# Patient Record
Sex: FEMALE | Race: WHITE | Employment: FULL TIME | URBAN - NONMETROPOLITAN AREA
[De-identification: names, ages, dates, MRNs, and addresses within clinical notes are randomized per-mention and may not be internally consistent; named-entity substitution may affect disease eponyms.]

---

## 2017-08-24 ENCOUNTER — OFFICE VISIT (OUTPATIENT)
Dept: FAMILY MEDICINE CLINIC | Age: 27
End: 2017-08-24

## 2017-08-24 VITALS
WEIGHT: 244 LBS | HEART RATE: 65 BPM | SYSTOLIC BLOOD PRESSURE: 122 MMHG | BODY MASS INDEX: 41.66 KG/M2 | DIASTOLIC BLOOD PRESSURE: 84 MMHG | HEIGHT: 64 IN | OXYGEN SATURATION: 98 % | RESPIRATION RATE: 16 BRPM

## 2017-08-24 DIAGNOSIS — Z30.09 BIRTH CONTROL COUNSELING: ICD-10-CM

## 2017-08-24 DIAGNOSIS — Z71.3 DIETARY COUNSELING: ICD-10-CM

## 2017-08-24 DIAGNOSIS — E66.01 MORBID OBESITY DUE TO EXCESS CALORIES (HCC): Primary | ICD-10-CM

## 2017-08-24 PROCEDURE — 99214 OFFICE O/P EST MOD 30 MIN: CPT | Performed by: NURSE PRACTITIONER

## 2017-08-24 ASSESSMENT — ENCOUNTER SYMPTOMS
GASTROINTESTINAL NEGATIVE: 1
RESPIRATORY NEGATIVE: 1

## 2017-11-25 ENCOUNTER — HOSPITAL ENCOUNTER (OUTPATIENT)
Dept: OTHER | Age: 27
Discharge: HOME | End: 2017-11-25
Attending: NURSE PRACTITIONER | Admitting: NURSE PRACTITIONER

## 2017-11-25 LAB
RAPID INFLUENZA  B AGN: NEGATIVE
RAPID INFLUENZA A AGN: NEGATIVE

## 2018-01-12 ENCOUNTER — OFFICE VISIT (OUTPATIENT)
Dept: FAMILY MEDICINE CLINIC | Age: 28
End: 2018-01-12

## 2018-01-12 VITALS
WEIGHT: 238 LBS | RESPIRATION RATE: 15 BRPM | DIASTOLIC BLOOD PRESSURE: 78 MMHG | BODY MASS INDEX: 40.85 KG/M2 | SYSTOLIC BLOOD PRESSURE: 122 MMHG | HEART RATE: 52 BPM

## 2018-01-12 DIAGNOSIS — R30.0 DYSURIA: Primary | ICD-10-CM

## 2018-01-12 LAB
BILIRUBIN, POC: NEGATIVE
BLOOD URINE, POC: NEGATIVE
CLARITY, POC: ABNORMAL
COLOR, POC: ABNORMAL
GLUCOSE URINE, POC: NEGATIVE
KETONES, POC: NEGATIVE
LEUKOCYTE EST, POC: NEGATIVE
NITRITE, POC: NEGATIVE
PH, POC: 5.5
PROTEIN, POC: ABNORMAL
SPECIFIC GRAVITY, POC: >=1.03
UROBILINOGEN, POC: 0.2

## 2018-01-12 PROCEDURE — 81002 URINALYSIS NONAUTO W/O SCOPE: CPT | Performed by: NURSE PRACTITIONER

## 2018-01-12 PROCEDURE — 99213 OFFICE O/P EST LOW 20 MIN: CPT | Performed by: NURSE PRACTITIONER

## 2018-01-12 RX ORDER — PHENAZOPYRIDINE HYDROCHLORIDE 200 MG/1
200 TABLET, FILM COATED ORAL 3 TIMES DAILY PRN
Qty: 9 TABLET | Refills: 0 | Status: SHIPPED | OUTPATIENT
Start: 2018-01-12 | End: 2018-01-15

## 2018-01-12 ASSESSMENT — ENCOUNTER SYMPTOMS
SHORTNESS OF BREATH: 0
WHEEZING: 0
VOMITING: 0
BACK PAIN: 0
COUGH: 0
RESPIRATORY NEGATIVE: 1
NAUSEA: 0

## 2018-01-12 ASSESSMENT — PATIENT HEALTH QUESTIONNAIRE - PHQ9
SUM OF ALL RESPONSES TO PHQ9 QUESTIONS 1 & 2: 0
1. LITTLE INTEREST OR PLEASURE IN DOING THINGS: 0
2. FEELING DOWN, DEPRESSED OR HOPELESS: 0
SUM OF ALL RESPONSES TO PHQ QUESTIONS 1-9: 0

## 2018-01-12 NOTE — PROGRESS NOTES
only drinks approximately 20 ounces of water a day and no other water. She does drink 3-4 mugs of coffee. I gave her a prescription for Pyridium to help with that discomfort  - POCT Urinalysis no Micro  - phenazopyridine (PYRIDIUM) 200 MG tablet; Take 1 tablet by mouth 3 times daily as needed for Pain  Dispense: 9 tablet; Refill: 0      Return if symptoms worsen or fail to improve.       Controlled Substances Monitoring:              (Please note that portions of this note may have been completed with a voice recognition program. Efforts were made to edit the dictations but occasionally words are mis-transcribed.)

## 2018-01-12 NOTE — PATIENT INSTRUCTIONS
Patient Education        Painful Urination (Dysuria): Care Instructions  Your Care Instructions  Burning pain with urination (dysuria) is a common symptom of a urinary tract infection or other urinary problems. The bladder may become inflamed. This can cause pain when the bladder fills and empties. You may also feel pain if the tube that carries urine from the bladder to the outside of the body (urethra) gets irritated or infected. Sexually transmitted infections (STIs) also may cause pain when you urinate. Sometimes the pain can be caused by things other than an infection. The urethra can be irritated by soaps, perfumes, or foreign objects in the urethra. Kidney stones can cause pain when they pass through the urethra. The cause may be hard to find. You may need tests. Treatment for painful urination depends on the cause. Follow-up care is a key part of your treatment and safety. Be sure to make and go to all appointments, and call your doctor if you are having problems. It's also a good idea to know your test results and keep a list of the medicines you take. How can you care for yourself at home? · Drink extra water for the next day or two. This will help make the urine less concentrated. (If you have kidney, heart, or liver disease and have to limit fluids, talk with your doctor before you increase the amount of fluids you drink.)  · Avoid drinks that are carbonated or have caffeine. They can irritate the bladder. · Urinate often. Try to empty your bladder each time. For women:  · Urinate right after you have sex. · After going to the bathroom, wipe from front to back. · Avoid douches, bubble baths, and feminine hygiene sprays. And avoid other feminine hygiene products that have deodorants. When should you call for help? Call your doctor now or seek immediate medical care if:  ? · You have new symptoms, such as fever, nausea, or vomiting. ? · You have new or worse symptoms of a urinary problem. For example:  ¨ You have blood or pus in your urine. ¨ You have chills or body aches. ¨ It hurts worse to urinate. ¨ You have groin or belly pain. ¨ You have pain in your back just below your rib cage (the flank area). ? Watch closely for changes in your health, and be sure to contact your doctor if you have any problems. Where can you learn more? Go to https://[employer]. org and sign in to your [employer] [facility] account. Enter C568 in the [employer] box to learn more about \"Painful Urination (Dysuria): Care Instructions. \"     If you do not have an account, please click on the \"Sign Up Now\" link. Current as of: May 12, 2017  Content Version: 11.5  © 7273-8051 Healthwise, Incorporated. Care instructions adapted under license by [facility] ([facility]). If you have questions about a medical condition or this instruction, always ask your healthcare professional. [place] [address] any warranty or liability for your use of this information.

## 2018-04-10 ENCOUNTER — OFFICE VISIT (OUTPATIENT)
Dept: FAMILY MEDICINE CLINIC | Age: 28
End: 2018-04-10

## 2018-04-10 VITALS
HEIGHT: 64 IN | OXYGEN SATURATION: 99 % | DIASTOLIC BLOOD PRESSURE: 80 MMHG | HEART RATE: 67 BPM | WEIGHT: 225 LBS | SYSTOLIC BLOOD PRESSURE: 124 MMHG | RESPIRATION RATE: 16 BRPM | BODY MASS INDEX: 38.41 KG/M2 | TEMPERATURE: 98.1 F

## 2018-04-10 DIAGNOSIS — J40 BRONCHITIS: Primary | ICD-10-CM

## 2018-04-10 PROCEDURE — 99214 OFFICE O/P EST MOD 30 MIN: CPT | Performed by: NURSE PRACTITIONER

## 2018-04-10 RX ORDER — CLARITHROMYCIN 500 MG/1
500 TABLET, COATED ORAL 2 TIMES DAILY
Qty: 20 TABLET | Refills: 0 | Status: SHIPPED | OUTPATIENT
Start: 2018-04-10 | End: 2018-04-20

## 2018-04-10 RX ORDER — BENZONATATE 100 MG/1
100 CAPSULE ORAL 3 TIMES DAILY PRN
Qty: 30 CAPSULE | Refills: 0 | Status: SHIPPED | OUTPATIENT
Start: 2018-04-10 | End: 2018-04-17

## 2018-04-10 RX ORDER — DEXTROMETHORPHAN HYDROBROMIDE AND PROMETHAZINE HYDROCHLORIDE 15; 6.25 MG/5ML; MG/5ML
5 SYRUP ORAL NIGHTLY PRN
Qty: 140 ML | Refills: 0 | Status: SHIPPED | OUTPATIENT
Start: 2018-04-10 | End: 2018-04-17

## 2018-04-10 ASSESSMENT — ENCOUNTER SYMPTOMS
SINUS PAIN: 0
EYES NEGATIVE: 1
VOMITING: 0
SINUS PRESSURE: 0
WHEEZING: 0
SHORTNESS OF BREATH: 0
DIARRHEA: 0
NAUSEA: 0
COUGH: 1
SORE THROAT: 0

## 2018-05-09 ENCOUNTER — TELEPHONE (OUTPATIENT)
Dept: FAMILY MEDICINE CLINIC | Age: 28
End: 2018-05-09

## 2018-05-10 ENCOUNTER — OFFICE VISIT (OUTPATIENT)
Dept: FAMILY MEDICINE CLINIC | Age: 28
End: 2018-05-10

## 2018-05-10 VITALS
OXYGEN SATURATION: 97 % | DIASTOLIC BLOOD PRESSURE: 60 MMHG | HEIGHT: 64 IN | WEIGHT: 231 LBS | TEMPERATURE: 99.3 F | SYSTOLIC BLOOD PRESSURE: 104 MMHG | BODY MASS INDEX: 39.44 KG/M2 | HEART RATE: 56 BPM | RESPIRATION RATE: 14 BRPM

## 2018-05-10 DIAGNOSIS — R94.31 ABNORMAL EKG: ICD-10-CM

## 2018-05-10 DIAGNOSIS — R55 SYNCOPE, UNSPECIFIED SYNCOPE TYPE: Primary | ICD-10-CM

## 2018-05-10 DIAGNOSIS — R00.1 BRADYCARDIA: ICD-10-CM

## 2018-05-10 DIAGNOSIS — I49.1 PAC (PREMATURE ATRIAL CONTRACTION): ICD-10-CM

## 2018-05-10 PROCEDURE — 93000 ELECTROCARDIOGRAM COMPLETE: CPT | Performed by: PHYSICIAN ASSISTANT

## 2018-05-10 PROCEDURE — 99214 OFFICE O/P EST MOD 30 MIN: CPT | Performed by: PHYSICIAN ASSISTANT

## 2018-05-10 ASSESSMENT — ENCOUNTER SYMPTOMS
DIARRHEA: 0
VOMITING: 0
TROUBLE SWALLOWING: 0
COUGH: 0
SHORTNESS OF BREATH: 0
ABDOMINAL PAIN: 0
NAUSEA: 0

## 2018-05-15 ENCOUNTER — INITIAL CONSULT (OUTPATIENT)
Dept: CARDIOLOGY CLINIC | Age: 28
End: 2018-05-15

## 2018-05-15 VITALS
BODY MASS INDEX: 39.88 KG/M2 | HEART RATE: 84 BPM | HEIGHT: 64 IN | SYSTOLIC BLOOD PRESSURE: 96 MMHG | DIASTOLIC BLOOD PRESSURE: 60 MMHG | WEIGHT: 233.6 LBS

## 2018-05-15 DIAGNOSIS — R55 VASOVAGAL SYNCOPE: Primary | ICD-10-CM

## 2018-05-15 PROCEDURE — 99243 OFF/OP CNSLTJ NEW/EST LOW 30: CPT | Performed by: INTERNAL MEDICINE

## 2018-05-15 RX ORDER — MIDODRINE HYDROCHLORIDE 2.5 MG/1
2.5 TABLET ORAL 3 TIMES DAILY
Qty: 90 TABLET | Refills: 3 | Status: SHIPPED | OUTPATIENT
Start: 2018-05-15 | End: 2018-05-24 | Stop reason: ALTCHOICE

## 2018-05-16 ENCOUNTER — TELEPHONE (OUTPATIENT)
Dept: CARDIOLOGY CLINIC | Age: 28
End: 2018-05-16

## 2018-05-16 NOTE — TELEPHONE ENCOUNTER
Patient was just put on Midodrine 5/15 she stated a hour after she took she had diarrhea and increased urination issues

## 2018-05-17 ENCOUNTER — TELEPHONE (OUTPATIENT)
Dept: CARDIOLOGY CLINIC | Age: 28
End: 2018-05-17

## 2018-05-17 ENCOUNTER — PROCEDURE VISIT (OUTPATIENT)
Dept: CARDIOLOGY CLINIC | Age: 28
End: 2018-05-17

## 2018-05-17 VITALS
BODY MASS INDEX: 39.78 KG/M2 | WEIGHT: 233 LBS | DIASTOLIC BLOOD PRESSURE: 60 MMHG | HEART RATE: 74 BPM | SYSTOLIC BLOOD PRESSURE: 122 MMHG | HEIGHT: 64 IN

## 2018-05-17 DIAGNOSIS — R55 VASOVAGAL SYNCOPE: ICD-10-CM

## 2018-05-17 DIAGNOSIS — R94.31 ABNORMAL EKG: ICD-10-CM

## 2018-05-17 PROCEDURE — 93015 CV STRESS TEST SUPVJ I&R: CPT | Performed by: INTERNAL MEDICINE

## 2018-05-18 RX ORDER — FLUDROCORTISONE ACETATE 0.1 MG/1
0.1 TABLET ORAL DAILY
COMMUNITY
End: 2018-05-24 | Stop reason: SDUPTHER

## 2018-05-24 ENCOUNTER — TELEPHONE (OUTPATIENT)
Dept: CARDIOLOGY CLINIC | Age: 28
End: 2018-05-24

## 2018-05-24 ENCOUNTER — PROCEDURE VISIT (OUTPATIENT)
Dept: CARDIOLOGY CLINIC | Age: 28
End: 2018-05-24

## 2018-05-24 DIAGNOSIS — R55 SYNCOPE, VASOVAGAL: Primary | ICD-10-CM

## 2018-05-24 DIAGNOSIS — R55 VASOVAGAL SYNCOPE: Primary | ICD-10-CM

## 2018-05-24 LAB
LV EF: 58 %
LVEF MODALITY: NORMAL

## 2018-05-24 PROCEDURE — 93880 EXTRACRANIAL BILAT STUDY: CPT | Performed by: INTERNAL MEDICINE

## 2018-05-24 PROCEDURE — 93306 TTE W/DOPPLER COMPLETE: CPT | Performed by: INTERNAL MEDICINE

## 2018-05-24 RX ORDER — FLUDROCORTISONE ACETATE 0.1 MG/1
0.1 TABLET ORAL DAILY
Qty: 30 TABLET | Refills: 2 | Status: SHIPPED | OUTPATIENT
Start: 2018-05-24 | End: 2018-06-06 | Stop reason: SDUPTHER

## 2018-05-25 ENCOUNTER — TELEPHONE (OUTPATIENT)
Dept: CARDIOLOGY CLINIC | Age: 28
End: 2018-05-25

## 2018-05-30 ENCOUNTER — TELEPHONE (OUTPATIENT)
Dept: CARDIOLOGY CLINIC | Age: 28
End: 2018-05-30

## 2018-05-30 NOTE — TELEPHONE ENCOUNTER
Skippy Mcburney called back to the office after seeing the phone number. Skippy Mcburney will be into sign release of information paper.

## 2018-05-31 ENCOUNTER — TELEPHONE (OUTPATIENT)
Dept: FAMILY MEDICINE CLINIC | Age: 28
End: 2018-05-31

## 2018-06-01 ENCOUNTER — TELEPHONE (OUTPATIENT)
Dept: CARDIOLOGY CLINIC | Age: 28
End: 2018-06-01

## 2018-06-06 ENCOUNTER — OFFICE VISIT (OUTPATIENT)
Dept: FAMILY MEDICINE CLINIC | Age: 28
End: 2018-06-06

## 2018-06-06 ENCOUNTER — OFFICE VISIT (OUTPATIENT)
Dept: CARDIOLOGY CLINIC | Age: 28
End: 2018-06-06

## 2018-06-06 VITALS
WEIGHT: 237.8 LBS | SYSTOLIC BLOOD PRESSURE: 116 MMHG | HEIGHT: 64 IN | DIASTOLIC BLOOD PRESSURE: 72 MMHG | HEART RATE: 72 BPM | BODY MASS INDEX: 40.6 KG/M2

## 2018-06-06 VITALS
HEART RATE: 72 BPM | WEIGHT: 237.4 LBS | SYSTOLIC BLOOD PRESSURE: 104 MMHG | DIASTOLIC BLOOD PRESSURE: 62 MMHG | RESPIRATION RATE: 16 BRPM | OXYGEN SATURATION: 98 % | BODY MASS INDEX: 40.75 KG/M2

## 2018-06-06 DIAGNOSIS — Z02.89 ENCOUNTER FOR COMPLETION OF FORM WITH PATIENT: Primary | ICD-10-CM

## 2018-06-06 DIAGNOSIS — I95.1 ORTHOSTATIC HYPOTENSION: Primary | ICD-10-CM

## 2018-06-06 DIAGNOSIS — R94.31 ABNORMAL EKG: ICD-10-CM

## 2018-06-06 DIAGNOSIS — R55 SYNCOPE, UNSPECIFIED SYNCOPE TYPE: ICD-10-CM

## 2018-06-06 DIAGNOSIS — E66.09 CLASS 1 OBESITY DUE TO EXCESS CALORIES WITH SERIOUS COMORBIDITY IN ADULT, UNSPECIFIED BMI: ICD-10-CM

## 2018-06-06 PROCEDURE — 99214 OFFICE O/P EST MOD 30 MIN: CPT | Performed by: INTERNAL MEDICINE

## 2018-06-06 PROCEDURE — 99213 OFFICE O/P EST LOW 20 MIN: CPT | Performed by: NURSE PRACTITIONER

## 2018-06-06 RX ORDER — PHENTERMINE HYDROCHLORIDE 37.5 MG/1
37.5 TABLET ORAL
Qty: 30 TABLET | Refills: 0 | Status: SHIPPED | OUTPATIENT
Start: 2018-06-06 | End: 2018-07-06 | Stop reason: SDUPTHER

## 2018-06-06 RX ORDER — FLUDROCORTISONE ACETATE 0.1 MG/1
0.1 TABLET ORAL DAILY
Qty: 90 TABLET | Refills: 3 | Status: SHIPPED | OUTPATIENT
Start: 2018-06-06 | End: 2020-06-08

## 2018-06-06 ASSESSMENT — ENCOUNTER SYMPTOMS
EYES NEGATIVE: 1
CHEST TIGHTNESS: 0
ABDOMINAL PAIN: 0
RESPIRATORY NEGATIVE: 1
NAUSEA: 0
COUGH: 0
EYE PAIN: 0
SHORTNESS OF BREATH: 0
GASTROINTESTINAL NEGATIVE: 1

## 2018-06-12 ENCOUNTER — TELEPHONE (OUTPATIENT)
Dept: CARDIOLOGY CLINIC | Age: 28
End: 2018-06-12

## 2018-06-13 ENCOUNTER — TELEPHONE (OUTPATIENT)
Dept: CARDIOLOGY CLINIC | Age: 28
End: 2018-06-13

## 2018-07-06 ENCOUNTER — OFFICE VISIT (OUTPATIENT)
Dept: CARDIOLOGY CLINIC | Age: 28
End: 2018-07-06

## 2018-07-06 VITALS
SYSTOLIC BLOOD PRESSURE: 126 MMHG | HEART RATE: 76 BPM | WEIGHT: 237.4 LBS | DIASTOLIC BLOOD PRESSURE: 70 MMHG | HEIGHT: 64 IN | BODY MASS INDEX: 40.53 KG/M2

## 2018-07-06 DIAGNOSIS — E66.09 CLASS 1 OBESITY DUE TO EXCESS CALORIES WITH SERIOUS COMORBIDITY IN ADULT, UNSPECIFIED BMI: ICD-10-CM

## 2018-07-06 PROCEDURE — 99213 OFFICE O/P EST LOW 20 MIN: CPT | Performed by: INTERNAL MEDICINE

## 2018-07-06 RX ORDER — PHENTERMINE HYDROCHLORIDE 37.5 MG/1
37.5 TABLET ORAL
Qty: 30 TABLET | Refills: 0 | Status: SHIPPED | OUTPATIENT
Start: 2018-07-06 | End: 2018-08-05

## 2018-07-06 NOTE — PROGRESS NOTES
delay  Respiratory:  Normal breath sounds, No respiratory distress, No wheezing, No chest tenderness. ,no use of accessory muscles, diaphragm movement is normal  Cardiovascular: (PMI) apex non displaced,no lifts no thrills, no s3,no s4, Normal heart rate, Normal rhythm, No murmurs, No rubs, No gallops. Carotid arteries pulse and amplitude are normal no bruit, no abdominal bruit noted ( normal abdominal aorta ausculation), femoral arteries pulse and amplitude are normal no bruit, pedal pulses are normal  Femoral pulses have normal amplitude, no bruits   Extremities - No cyanosis, clubbing, or significant edema, no varicose veins    Abdomen  No masses, tenderness, or organomegaly, no hepato-splenomegally, no bruits  Musculoskeletal  No significant edema, no kyphosis or scoliosis, no deformity in any extremity noted, muscle strength and tone are normal  Skin: no ulcer,no scar,no stasis dermatitis   Neurologic  alert oriented times 3,Cranial nerves II through XII are grossly intact. There were no gross focal neurologic abnormalities. All sensory and motor nerves examined and were normal  Psychiatric: normal mood and affect    No results found for: CKTOTAL, CKMB, CKMBINDEX, TROPONINI  BNP:  No results found for: BNP  PT/INR:  No results found for: PTINR  No results found for: LABA1C  Lab Results   Component Value Date    CHOL 153 07/28/2016    TRIG 73 07/28/2016    HDL 44 07/28/2016    LDLCALC 94 07/28/2016     Lab Results   Component Value Date    ALT 12 05/06/2017    AST 12 (L) 05/06/2017     TSH:    Lab Results   Component Value Date    TSH 1.95 07/28/2016       Impression:  Karli Smith is a 24 y. o.year old who  has a past medical history of ADHD (attention deficit hyperactivity disorder); Bronchitis; Chronic back pain; H/O Doppler ultrasound (Bilateral Carotids); H/O echocardiogram; H/O exercise stress test; Pertussis; Seasonal allergies; and Syncope. and presents with     Plan:  1.  Vasovagal syncope and hypotension:

## 2018-08-13 ENCOUNTER — TELEPHONE (OUTPATIENT)
Dept: FAMILY MEDICINE CLINIC | Age: 28
End: 2018-08-13

## 2018-11-28 ENCOUNTER — OFFICE VISIT (OUTPATIENT)
Dept: FAMILY MEDICINE CLINIC | Age: 28
End: 2018-11-28
Payer: COMMERCIAL

## 2018-11-28 VITALS
RESPIRATION RATE: 16 BRPM | HEIGHT: 65 IN | BODY MASS INDEX: 41.48 KG/M2 | HEART RATE: 61 BPM | SYSTOLIC BLOOD PRESSURE: 102 MMHG | WEIGHT: 249 LBS | OXYGEN SATURATION: 98 % | DIASTOLIC BLOOD PRESSURE: 64 MMHG

## 2018-11-28 DIAGNOSIS — Z78.9 PARTICIPANT IN HEALTH AND WELLNESS PLAN: Primary | ICD-10-CM

## 2018-11-28 DIAGNOSIS — Z23 NEEDS FLU SHOT: ICD-10-CM

## 2018-11-28 DIAGNOSIS — Z13.220 SCREENING FOR CHOLESTEROL LEVEL: ICD-10-CM

## 2018-11-28 DIAGNOSIS — B35.1 TOENAIL FUNGUS: ICD-10-CM

## 2018-11-28 DIAGNOSIS — Z13.1 SCREENING FOR DIABETES MELLITUS: ICD-10-CM

## 2018-11-28 LAB
CHOLESTEROL, FASTING: 183 MG/DL (ref 0–199)
GLUCOSE BLD-MCNC: 90 MG/DL (ref 70–99)
HDLC SERPL-MCNC: 53 MG/DL (ref 40–60)
LDL CHOLESTEROL CALCULATED: 112 MG/DL
TRIGLYCERIDE, FASTING: 90 MG/DL (ref 0–150)
VLDLC SERPL CALC-MCNC: 18 MG/DL

## 2018-11-28 PROCEDURE — 99395 PREV VISIT EST AGE 18-39: CPT | Performed by: NURSE PRACTITIONER

## 2018-11-28 PROCEDURE — 90471 IMMUNIZATION ADMIN: CPT | Performed by: NURSE PRACTITIONER

## 2018-11-28 PROCEDURE — 36415 COLL VENOUS BLD VENIPUNCTURE: CPT | Performed by: NURSE PRACTITIONER

## 2018-11-28 PROCEDURE — 90686 IIV4 VACC NO PRSV 0.5 ML IM: CPT | Performed by: NURSE PRACTITIONER

## 2018-11-28 ASSESSMENT — ENCOUNTER SYMPTOMS
SHORTNESS OF BREATH: 0
EYE PAIN: 0
EYES NEGATIVE: 1
RHINORRHEA: 0
COLOR CHANGE: 0
EYE REDNESS: 0
NAUSEA: 0
CHEST TIGHTNESS: 0
RESPIRATORY NEGATIVE: 1
COUGH: 0
SINUS PRESSURE: 0
DIARRHEA: 0
SORE THROAT: 0
GASTROINTESTINAL NEGATIVE: 1
BACK PAIN: 0
VOMITING: 0
EYE DISCHARGE: 0

## 2018-11-28 NOTE — PROGRESS NOTES
dysphoric mood and sleep disturbance. The patient is not nervous/anxious. OBJECTIVE:    /64   Pulse 61   Resp 16   Ht 5' 4.5\" (1.638 m)   Wt 249 lb (112.9 kg)   SpO2 98%   BMI 42.08 kg/m²     Physical Exam   Constitutional: She is oriented to person, place, and time. She appears well-developed and well-nourished. No distress. HENT:   Head: Normocephalic and atraumatic. Mouth/Throat: Oropharynx is clear and moist.   Eyes: Pupils are equal, round, and reactive to light. Conjunctivae and EOM are normal. Right eye exhibits no discharge. Left eye exhibits no discharge. Neck: Normal range of motion. Neck supple. Cardiovascular: Normal rate, regular rhythm, normal heart sounds and intact distal pulses. Exam reveals no gallop and no friction rub. No murmur heard. Pulmonary/Chest: Effort normal and breath sounds normal. No respiratory distress. She has no wheezes. Abdominal: Soft. Bowel sounds are normal. She exhibits no distension. There is no tenderness. Musculoskeletal: Normal range of motion. Neurological: She is alert and oriented to person, place, and time. She has normal reflexes. Skin: Skin is warm and dry. Capillary refill takes less than 2 seconds. Psychiatric: She has a normal mood and affect. Her behavior is normal. Judgment and thought content normal.   Vitals reviewed. ASSESSMENT/PLAN:    Problem List     Toenail fungus     Patient reports this is been a problem for years. She was on terbinafine but didn't notice any good effect. She would like to try something different. Current Outpatient Prescriptions   Medication Sig Dispense Refill    fludrocortisone (FLORINEF) 0.1 MG tablet Take 1 tablet by mouth daily 90 tablet 3    cetirizine (ZYRTEC) 5 MG tablet Take 5 mg by mouth daily      ibuprofen (ADVIL;MOTRIN) 600 MG tablet Take 600 mg by mouth every 6 hours as needed for Pain      Multiple Vitamins-Minerals (MULTI FOR HER PO) Take  by mouth.

## 2018-11-28 NOTE — ASSESSMENT & PLAN NOTE
Patient reports this is been a problem for years. She was on terbinafine but didn't notice any good effect. She would like to try something different.

## 2018-11-29 ENCOUNTER — TELEPHONE (OUTPATIENT)
Dept: FAMILY MEDICINE CLINIC | Age: 28
End: 2018-11-29

## 2019-03-06 ENCOUNTER — OFFICE VISIT (OUTPATIENT)
Dept: FAMILY MEDICINE CLINIC | Age: 29
End: 2019-03-06
Payer: COMMERCIAL

## 2019-03-06 VITALS
WEIGHT: 253.6 LBS | SYSTOLIC BLOOD PRESSURE: 102 MMHG | HEART RATE: 89 BPM | DIASTOLIC BLOOD PRESSURE: 60 MMHG | BODY MASS INDEX: 42.86 KG/M2 | RESPIRATION RATE: 16 BRPM | OXYGEN SATURATION: 98 % | TEMPERATURE: 98.4 F

## 2019-03-06 DIAGNOSIS — R19.7 DIARRHEA, UNSPECIFIED TYPE: ICD-10-CM

## 2019-03-06 DIAGNOSIS — R11.0 NAUSEA: Primary | ICD-10-CM

## 2019-03-06 PROCEDURE — 99213 OFFICE O/P EST LOW 20 MIN: CPT | Performed by: NURSE PRACTITIONER

## 2019-03-06 RX ORDER — ONDANSETRON 4 MG/1
4 TABLET, FILM COATED ORAL EVERY 8 HOURS PRN
Qty: 90 TABLET | Refills: 0 | Status: SHIPPED | OUTPATIENT
Start: 2019-03-06 | End: 2019-04-15 | Stop reason: ALTCHOICE

## 2019-03-06 ASSESSMENT — PATIENT HEALTH QUESTIONNAIRE - PHQ9
SUM OF ALL RESPONSES TO PHQ9 QUESTIONS 1 & 2: 0
1. LITTLE INTEREST OR PLEASURE IN DOING THINGS: 0
SUM OF ALL RESPONSES TO PHQ QUESTIONS 1-9: 0
SUM OF ALL RESPONSES TO PHQ QUESTIONS 1-9: 0
2. FEELING DOWN, DEPRESSED OR HOPELESS: 0

## 2019-03-06 ASSESSMENT — ENCOUNTER SYMPTOMS
NAUSEA: 1
SORE THROAT: 0

## 2019-03-07 ASSESSMENT — ENCOUNTER SYMPTOMS
WHEEZING: 0
VOMITING: 1
COUGH: 0
EYES NEGATIVE: 1
DIARRHEA: 0
SHORTNESS OF BREATH: 0
EYE ITCHING: 0
ABDOMINAL PAIN: 0
RESPIRATORY NEGATIVE: 1
EYE DISCHARGE: 0

## 2019-03-08 ENCOUNTER — TELEPHONE (OUTPATIENT)
Dept: FAMILY MEDICINE CLINIC | Age: 29
End: 2019-03-08

## 2019-03-11 ENCOUNTER — OFFICE VISIT (OUTPATIENT)
Dept: FAMILY MEDICINE CLINIC | Age: 29
End: 2019-03-11
Payer: COMMERCIAL

## 2019-03-11 VITALS
RESPIRATION RATE: 16 BRPM | DIASTOLIC BLOOD PRESSURE: 60 MMHG | TEMPERATURE: 98.5 F | BODY MASS INDEX: 42.76 KG/M2 | WEIGHT: 253 LBS | HEART RATE: 84 BPM | OXYGEN SATURATION: 100 % | SYSTOLIC BLOOD PRESSURE: 100 MMHG

## 2019-03-11 DIAGNOSIS — R19.7 DIARRHEA, UNSPECIFIED TYPE: ICD-10-CM

## 2019-03-11 DIAGNOSIS — R11.15 NON-INTRACTABLE CYCLICAL VOMITING WITH NAUSEA: Primary | ICD-10-CM

## 2019-03-11 PROCEDURE — 99213 OFFICE O/P EST LOW 20 MIN: CPT | Performed by: NURSE PRACTITIONER

## 2019-03-11 ASSESSMENT — ENCOUNTER SYMPTOMS
WHEEZING: 0
CHEST TIGHTNESS: 0
VOMITING: 1
DIARRHEA: 1
CONSTIPATION: 0
SHORTNESS OF BREATH: 0
NAUSEA: 1
ABDOMINAL PAIN: 0
COUGH: 0

## 2019-04-15 ENCOUNTER — OFFICE VISIT (OUTPATIENT)
Dept: FAMILY MEDICINE CLINIC | Age: 29
End: 2019-04-15
Payer: COMMERCIAL

## 2019-04-15 ENCOUNTER — TELEPHONE (OUTPATIENT)
Dept: FAMILY MEDICINE CLINIC | Age: 29
End: 2019-04-15

## 2019-04-15 VITALS
HEIGHT: 65 IN | DIASTOLIC BLOOD PRESSURE: 62 MMHG | HEART RATE: 65 BPM | RESPIRATION RATE: 14 BRPM | WEIGHT: 249.4 LBS | BODY MASS INDEX: 41.55 KG/M2 | SYSTOLIC BLOOD PRESSURE: 102 MMHG

## 2019-04-15 DIAGNOSIS — H69.81 DYSFUNCTION OF RIGHT EUSTACHIAN TUBE: ICD-10-CM

## 2019-04-15 DIAGNOSIS — R55 VASOVAGAL SYNCOPE: Primary | ICD-10-CM

## 2019-04-15 DIAGNOSIS — J30.2 SEASONAL ALLERGIES: ICD-10-CM

## 2019-04-15 PROBLEM — H69.91 DYSFUNCTION OF RIGHT EUSTACHIAN TUBE: Status: ACTIVE | Noted: 2019-04-15

## 2019-04-15 PROCEDURE — 99213 OFFICE O/P EST LOW 20 MIN: CPT | Performed by: PHYSICIAN ASSISTANT

## 2019-04-15 RX ORDER — METHYLPREDNISOLONE 4 MG/1
TABLET ORAL
Qty: 1 KIT | Refills: 0 | Status: SHIPPED | OUTPATIENT
Start: 2019-04-15 | End: 2019-04-21

## 2019-04-15 RX ORDER — FLUTICASONE PROPIONATE 50 MCG
2 SPRAY, SUSPENSION (ML) NASAL DAILY
Qty: 1 BOTTLE | Refills: 0 | Status: SHIPPED | OUTPATIENT
Start: 2019-04-15 | End: 2019-05-07 | Stop reason: SDUPTHER

## 2019-04-15 ASSESSMENT — ENCOUNTER SYMPTOMS
SHORTNESS OF BREATH: 0
COUGH: 0
SINUS PRESSURE: 1
TROUBLE SWALLOWING: 0
RHINORRHEA: 1
SORE THROAT: 0

## 2019-04-15 ASSESSMENT — PATIENT HEALTH QUESTIONNAIRE - PHQ9
SUM OF ALL RESPONSES TO PHQ QUESTIONS 1-9: 0
SUM OF ALL RESPONSES TO PHQ QUESTIONS 1-9: 0
2. FEELING DOWN, DEPRESSED OR HOPELESS: 0
1. LITTLE INTEREST OR PLEASURE IN DOING THINGS: 0
SUM OF ALL RESPONSES TO PHQ9 QUESTIONS 1 & 2: 0

## 2019-04-15 NOTE — PROGRESS NOTES
[facility]  1990  34 y.o.  female    SUBJECTIVE:    Chief Complaint   Patient presents with    Blood Pressure Check     at work not able to keep balance; did not feel a decreased in bp as normally does, went to side, did not keep balance; kept herself from falling over; she has the note from her medical dept. BP was 138/ ; dropped to 96/53, 98/54, 98/60; ear problem? ? happens every once in a while, this time at work       HPI  Low BP-chronic, while at work today she started having issue keeping balance. States it did not feel like normal episodes when BP drops. At onset pt was putting bumper on , felt dizzy and took several steps to the side to try get balance. Went to medical and her BP readings npis770/69, 96/53. Dizziness does seem to be worse with rapid head movement. Pt states she has had increasing sinus congestion/ear pressure/nasal congestion over the last week. Current Outpatient Medications on File Prior to Visit   Medication Sig Dispense Refill    fludrocortisone (FLORINEF) 0.1 MG tablet Take 1 tablet by mouth daily 90 tablet 3    cetirizine (ZYRTEC) 5 MG tablet Take 5 mg by mouth daily      ibuprofen (ADVIL;MOTRIN) 600 MG tablet Take 600 mg by mouth every 6 hours as needed for Pain      Multiple Vitamins-Minerals (MULTI FOR HER PO) Take  by mouth. No current facility-administered medications on file prior to visit. Review of PMH, PSH, Family Hx, Allergies and updates made as needed. PHQ-9 Total Score: 0 (4/15/2019 10:45 AM)      Allergies   Allergen Reactions    Amoxicillin Hives and Swelling       Past Medical History:   Diagnosis Date    ADHD (attention deficit hyperactivity disorder)     Bronchitis     Chronic back pain     H/O Doppler ultrasound (Bilateral Carotids) 05/24/2018    Normal vertebral flow. No hemodynamically significant stenosis noted in the internal carotid artery bilaterally.  H/O echocardiogram 05/24/2018    Normal study. EF 55-60%.     H/O exercise stress test 05/17/2018    treadmill    Pertussis     Seasonal allergies     Syncope        Past Surgical History:   Procedure Laterality Date    WISDOM TOOTH EXTRACTION         Social History     Socioeconomic History    Marital status: Single     Spouse name: None    Number of children: None    Years of education: None    Highest education level: None   Occupational History    None   Social Needs    Financial resource strain: None    Food insecurity:     Worry: None     Inability: None    Transportation needs:     Medical: None     Non-medical: None   Tobacco Use    Smoking status: Never Smoker    Smokeless tobacco: Never Used   Substance and Sexual Activity    Alcohol use: Yes     Comment: Rare    Drug use: No    Sexual activity: Not Currently   Lifestyle    Physical activity:     Days per week: None     Minutes per session: None    Stress: None   Relationships    Social connections:     Talks on phone: None     Gets together: None     Attends Sabianism service: None     Active member of club or organization: None     Attends meetings of clubs or organizations: None     Relationship status: None    Intimate partner violence:     Fear of current or ex partner: None     Emotionally abused: None     Physically abused: None     Forced sexual activity: None   Other Topics Concern    None   Social History Narrative    None       Review of Systems   Constitutional: Negative for activity change, appetite change and fever. HENT: Positive for congestion, rhinorrhea and sinus pressure. Negative for sore throat and trouble swallowing. Respiratory: Negative for cough and shortness of breath. Cardiovascular: Negative for chest pain, palpitations and leg swelling. Skin: Negative for rash. Neurological: Positive for light-headedness. Hematological: Negative for adenopathy.        OBJECTIVE:    /62 (Site: Right Upper Arm, Position: Sitting, Cuff Size: Large Adult)   Pulse 65 Resp 14   Ht 5' 5.25\" (1.657 m)   Wt 249 lb 6.4 oz (113.1 kg)   LMP 03/30/2019   Breastfeeding? No   BMI 41.18 kg/m²     Physical Exam   Constitutional: She is oriented to person, place, and time. She appears well-developed and well-nourished. No distress. HENT:   Head: Normocephalic. Right Ear: Tympanic membrane is bulging. Nose: Left sinus exhibits maxillary sinus tenderness. Mouth/Throat: Oropharynx is clear and moist.   Eyes: Conjunctivae are normal. Right eye exhibits nystagmus. Left eye exhibits nystagmus. Neck: Normal range of motion. Neck supple. Cardiovascular: Normal rate, regular rhythm and normal heart sounds. Pulmonary/Chest: Effort normal and breath sounds normal.   Neurological: She is alert and oriented to person, place, and time. No cranial nerve deficit. Skin: Skin is warm and dry. ASSESSMENT/PLAN:    Problem List        Circulatory    Vasovagal syncope - Primary     Continue florinef, f/u with cardiology if not improving            Nervous and Auditory    Dysfunction of right eustachian tube     Will start medrol dose pack/flonase/zyrtec  Recheck if not improving in next few weeks, sooner if worse            Other    Seasonal allergies     Will start medrol dose pack/flonase/zyrtec  Recheck if not improving in next few weeks, sooner if worse                    Return if symptoms worsen or fail to improve.

## 2019-04-15 NOTE — ASSESSMENT & PLAN NOTE
Will start medrol dose pack/flonase/zyrtec  Recheck if not improving in next few weeks, sooner if worse

## 2019-11-22 ENCOUNTER — OFFICE VISIT (OUTPATIENT)
Dept: FAMILY MEDICINE CLINIC | Age: 29
End: 2019-11-22
Payer: COMMERCIAL

## 2019-11-22 VITALS
DIASTOLIC BLOOD PRESSURE: 60 MMHG | TEMPERATURE: 98.2 F | HEIGHT: 64 IN | BODY MASS INDEX: 43.77 KG/M2 | HEART RATE: 59 BPM | RESPIRATION RATE: 18 BRPM | WEIGHT: 256.4 LBS | OXYGEN SATURATION: 97 % | SYSTOLIC BLOOD PRESSURE: 110 MMHG

## 2019-11-22 DIAGNOSIS — B35.1 ONYCHOMYCOSIS: ICD-10-CM

## 2019-11-22 DIAGNOSIS — Z00.00 ENCOUNTER FOR WELLNESS EXAMINATION: Primary | ICD-10-CM

## 2019-11-22 DIAGNOSIS — Z01.419 VISIT FOR PELVIC EXAM: ICD-10-CM

## 2019-11-22 LAB
A/G RATIO: 1.7 (ref 1.1–2.2)
ALBUMIN SERPL-MCNC: 4.3 G/DL (ref 3.4–5)
ALP BLD-CCNC: 69 U/L (ref 40–129)
ALT SERPL-CCNC: 16 U/L (ref 10–40)
ANION GAP SERPL CALCULATED.3IONS-SCNC: 13 MMOL/L (ref 3–16)
AST SERPL-CCNC: 16 U/L (ref 15–37)
BASOPHILS ABSOLUTE: 0 K/UL (ref 0–0.2)
BASOPHILS RELATIVE PERCENT: 0.4 %
BILIRUB SERPL-MCNC: 0.3 MG/DL (ref 0–1)
BUN BLDV-MCNC: 19 MG/DL (ref 7–20)
CALCIUM SERPL-MCNC: 9.2 MG/DL (ref 8.3–10.6)
CHLORIDE BLD-SCNC: 102 MMOL/L (ref 99–110)
CHOLESTEROL, FASTING: 167 MG/DL (ref 0–199)
CO2: 25 MMOL/L (ref 21–32)
CREAT SERPL-MCNC: 0.9 MG/DL (ref 0.6–1.1)
EOSINOPHILS ABSOLUTE: 0.2 K/UL (ref 0–0.6)
EOSINOPHILS RELATIVE PERCENT: 2.3 %
GFR AFRICAN AMERICAN: >60
GFR NON-AFRICAN AMERICAN: >60
GLOBULIN: 2.6 G/DL
GLUCOSE FASTING: 95 MG/DL (ref 70–99)
HCT VFR BLD CALC: 39.6 % (ref 36–48)
HDLC SERPL-MCNC: 57 MG/DL (ref 40–60)
HEMOGLOBIN: 13.6 G/DL (ref 12–16)
LDL CHOLESTEROL CALCULATED: 96 MG/DL
LYMPHOCYTES ABSOLUTE: 2.5 K/UL (ref 1–5.1)
LYMPHOCYTES RELATIVE PERCENT: 32 %
MCH RBC QN AUTO: 30.2 PG (ref 26–34)
MCHC RBC AUTO-ENTMCNC: 34.3 G/DL (ref 31–36)
MCV RBC AUTO: 88.2 FL (ref 80–100)
MONOCYTES ABSOLUTE: 0.4 K/UL (ref 0–1.3)
MONOCYTES RELATIVE PERCENT: 4.8 %
NEUTROPHILS ABSOLUTE: 4.7 K/UL (ref 1.7–7.7)
NEUTROPHILS RELATIVE PERCENT: 60.5 %
PDW BLD-RTO: 13.2 % (ref 12.4–15.4)
PLATELET # BLD: 237 K/UL (ref 135–450)
PMV BLD AUTO: 9.1 FL (ref 5–10.5)
POTASSIUM SERPL-SCNC: 4.2 MMOL/L (ref 3.5–5.1)
RBC # BLD: 4.49 M/UL (ref 4–5.2)
SODIUM BLD-SCNC: 140 MMOL/L (ref 136–145)
TOTAL PROTEIN: 6.9 G/DL (ref 6.4–8.2)
TRIGLYCERIDE, FASTING: 69 MG/DL (ref 0–150)
TSH SERPL DL<=0.05 MIU/L-ACNC: 3.05 UIU/ML (ref 0.27–4.2)
VLDLC SERPL CALC-MCNC: 14 MG/DL
WBC # BLD: 7.8 K/UL (ref 4–11)

## 2019-11-22 PROCEDURE — 36415 COLL VENOUS BLD VENIPUNCTURE: CPT | Performed by: NURSE PRACTITIONER

## 2019-11-22 PROCEDURE — 99215 OFFICE O/P EST HI 40 MIN: CPT | Performed by: NURSE PRACTITIONER

## 2019-11-22 ASSESSMENT — PATIENT HEALTH QUESTIONNAIRE - PHQ9
SUM OF ALL RESPONSES TO PHQ9 QUESTIONS 1 & 2: 0
2. FEELING DOWN, DEPRESSED OR HOPELESS: 0
1. LITTLE INTEREST OR PLEASURE IN DOING THINGS: 0
SUM OF ALL RESPONSES TO PHQ QUESTIONS 1-9: 0
SUM OF ALL RESPONSES TO PHQ QUESTIONS 1-9: 0

## 2019-11-23 LAB
CANDIDA SPECIES, DNA PROBE: NORMAL
GARDNERELLA VAGINALIS, DNA PROBE: NORMAL
TRICHOMONAS VAGINALIS DNA: NORMAL

## 2019-11-24 ASSESSMENT — ENCOUNTER SYMPTOMS
COUGH: 0
TROUBLE SWALLOWING: 0
BACK PAIN: 0
WHEEZING: 0
CHEST TIGHTNESS: 0
PHOTOPHOBIA: 0
CONSTIPATION: 0
VOMITING: 0
BLOOD IN STOOL: 0
ABDOMINAL PAIN: 0
RHINORRHEA: 0
SORE THROAT: 0
SINUS PAIN: 0
NAUSEA: 0
EYE PAIN: 0
SHORTNESS OF BREATH: 0
SINUS PRESSURE: 0
DIARRHEA: 0

## 2019-11-25 ENCOUNTER — TELEPHONE (OUTPATIENT)
Dept: FAMILY MEDICINE CLINIC | Age: 29
End: 2019-11-25

## 2019-11-25 LAB
C TRACH DNA GENITAL QL NAA+PROBE: NEGATIVE
N. GONORRHOEAE DNA: NEGATIVE

## 2019-11-26 ENCOUNTER — TELEPHONE (OUTPATIENT)
Dept: FAMILY MEDICINE CLINIC | Age: 29
End: 2019-11-26

## 2020-06-08 ENCOUNTER — OFFICE VISIT (OUTPATIENT)
Dept: FAMILY MEDICINE CLINIC | Age: 30
End: 2020-06-08
Payer: COMMERCIAL

## 2020-06-08 VITALS
BODY MASS INDEX: 46.72 KG/M2 | DIASTOLIC BLOOD PRESSURE: 66 MMHG | TEMPERATURE: 98.2 F | OXYGEN SATURATION: 98 % | RESPIRATION RATE: 16 BRPM | WEIGHT: 272.2 LBS | HEART RATE: 68 BPM | SYSTOLIC BLOOD PRESSURE: 98 MMHG

## 2020-06-08 PROCEDURE — 99213 OFFICE O/P EST LOW 20 MIN: CPT | Performed by: NURSE PRACTITIONER

## 2020-06-08 ASSESSMENT — ENCOUNTER SYMPTOMS
DIARRHEA: 0
CHEST TIGHTNESS: 0
WHEEZING: 0
CONSTIPATION: 0
VOMITING: 0
ABDOMINAL PAIN: 0
SHORTNESS OF BREATH: 0
NAUSEA: 0
COUGH: 0

## 2020-06-08 ASSESSMENT — PATIENT HEALTH QUESTIONNAIRE - PHQ9
1. LITTLE INTEREST OR PLEASURE IN DOING THINGS: 0
SUM OF ALL RESPONSES TO PHQ9 QUESTIONS 1 & 2: 0
SUM OF ALL RESPONSES TO PHQ QUESTIONS 1-9: 0
SUM OF ALL RESPONSES TO PHQ QUESTIONS 1-9: 0
2. FEELING DOWN, DEPRESSED OR HOPELESS: 0

## 2020-06-08 NOTE — PROGRESS NOTES
SUBJECTIVE:    Elouise Seip  1990  34 y.o.  female      Chief Complaint   Patient presents with    Toe Pain     Pt injured her 2nd toe and right foot and has toenail fungus and is worried the fungus will spread to the wound. HPI     She tripped over son's toy two days ago. Peeled back the skin on her third toe. She did have some bleeding that resolved within a few minutes. She has toenail fungus and is worried about area getting infected. No redness, drainage. She cleaned it with peroxide. Applied neosporin. Tenderness with walking. She has taken ibuprofen with relief. Symptoms gradually improving. Allergies   Allergen Reactions    Amoxicillin Hives and Swelling       Current Outpatient Medications   Medication Sig Dispense Refill    Multiple Vitamins-Minerals (MULTI FOR HER PO) Take  by mouth. No current facility-administered medications for this visit. Past Medical History:   Diagnosis Date    ADHD (attention deficit hyperactivity disorder)     Bronchitis     Chronic back pain     H/O Doppler ultrasound (Bilateral Carotids) 05/24/2018    Normal vertebral flow. No hemodynamically significant stenosis noted in the internal carotid artery bilaterally.  H/O echocardiogram 05/24/2018    Normal study. EF 55-60%.     H/O exercise stress test 05/17/2018    treadmill    Pertussis     Seasonal allergies     Syncope      Past Surgical History:   Procedure Laterality Date    WISDOM TOOTH EXTRACTION       Social History     Socioeconomic History    Marital status: Single     Spouse name: None    Number of children: None    Years of education: None    Highest education level: None   Occupational History    None   Social Needs    Financial resource strain: None    Food insecurity     Worry: None     Inability: None    Transportation needs     Medical: None     Non-medical: None   Tobacco Use    Smoking status: Never Smoker    Smokeless tobacco: Never Used   Substance and sounds: No murmur. No friction rub. No gallop. Pulmonary:      Effort: Pulmonary effort is normal.      Breath sounds: Normal breath sounds. No wheezing, rhonchi or rales. Abdominal:      Palpations: Abdomen is soft. Musculoskeletal: Normal range of motion. Skin:     General: Skin is warm and dry. Comments: 0.5 cm blister to tip of third toe of right foot. No redness, swelling, drainage. No bleeding  Toe nail fungus   Neurological:      General: No focal deficit present. Mental Status: She is alert and oriented to person, place, and time. Psychiatric:         Mood and Affect: Mood normal.         Behavior: Behavior normal. Behavior is cooperative. Thought Content: Thought content normal.         ASSESSMENT/PLAN:    1. Injury of toe on right foot, initial encounter    2. Blister of toe of right foot, initial encounter  Advised to keep area clean with mild soap and water. Educated on signs and symptoms of infection. Follow up if signs of infection develop. 3. Toenail fungus  Discussed using emery board to rough up nail and apply vicks or antifungal. Bleach socks. Clean shoes/air out. Return if symptoms worsen or fail to improve.       (Please note that portions of this note may have been completed with a voice recognition program. Efforts were made to edit the dictations but occasionally words aremis-transcribed.)

## 2020-06-25 ENCOUNTER — OFFICE VISIT (OUTPATIENT)
Dept: FAMILY MEDICINE CLINIC | Age: 30
End: 2020-06-25
Payer: COMMERCIAL

## 2020-06-25 VITALS
OXYGEN SATURATION: 98 % | RESPIRATION RATE: 16 BRPM | SYSTOLIC BLOOD PRESSURE: 104 MMHG | HEART RATE: 78 BPM | WEIGHT: 266.6 LBS | DIASTOLIC BLOOD PRESSURE: 80 MMHG | BODY MASS INDEX: 45.76 KG/M2 | TEMPERATURE: 97.2 F

## 2020-06-25 PROCEDURE — 99213 OFFICE O/P EST LOW 20 MIN: CPT | Performed by: NURSE PRACTITIONER

## 2020-06-25 RX ORDER — SULFAMETHOXAZOLE AND TRIMETHOPRIM 800; 160 MG/1; MG/1
1 TABLET ORAL 2 TIMES DAILY
Qty: 14 TABLET | Refills: 0 | Status: SHIPPED | OUTPATIENT
Start: 2020-06-25 | End: 2020-07-02

## 2020-06-25 RX ORDER — CETIRIZINE HYDROCHLORIDE 10 MG/1
10 TABLET ORAL DAILY
COMMUNITY
End: 2021-08-12

## 2020-06-25 ASSESSMENT — ENCOUNTER SYMPTOMS
PHOTOPHOBIA: 0
TROUBLE SWALLOWING: 0
BLOOD IN STOOL: 0
SORE THROAT: 0
CHEST TIGHTNESS: 0
COUGH: 0
VOMITING: 0
WHEEZING: 0
SINUS PRESSURE: 0
CONSTIPATION: 0
BACK PAIN: 0
EYE PAIN: 0
SHORTNESS OF BREATH: 0
ABDOMINAL PAIN: 0
SINUS PAIN: 0
DIARRHEA: 0
RHINORRHEA: 0
NAUSEA: 0

## 2020-06-25 ASSESSMENT — PATIENT HEALTH QUESTIONNAIRE - PHQ9
2. FEELING DOWN, DEPRESSED OR HOPELESS: 0
SUM OF ALL RESPONSES TO PHQ QUESTIONS 1-9: 0
SUM OF ALL RESPONSES TO PHQ9 QUESTIONS 1 & 2: 0
1. LITTLE INTEREST OR PLEASURE IN DOING THINGS: 0
SUM OF ALL RESPONSES TO PHQ QUESTIONS 1-9: 0

## 2020-06-25 NOTE — PROGRESS NOTES
6/25/20    Chief Complaint   Patient presents with    Other     patient has cyst on her inner thigh on her left leg the area is painful about the size of a marble she has tried to pop the area it had a white head on the area when she first noticed the area symptoms for about a week       Kenyon Lozano, (1990), is a 34 y.o. female, is here for evaluation of the following medical concerns:    HPI    Abscess:  She is here today with c/o a 1 week history of boil to left inner thigh. States that she did try and pop the area and squeezed, \"little thick white stuff came out several days ago\". Denies increasing pain, redness, fever, chills, or leg pain. Only painful when pressing down on the area. Review of Systems   Constitutional: Negative for activity change, appetite change, chills, diaphoresis, fatigue, fever and unexpected weight change. HENT: Negative for congestion, dental problem, ear pain, hearing loss, mouth sores, nosebleeds, postnasal drip, rhinorrhea, sinus pressure, sinus pain, sore throat, tinnitus and trouble swallowing. Eyes: Negative for photophobia, pain and visual disturbance. Respiratory: Negative for cough, chest tightness, shortness of breath and wheezing. Cardiovascular: Negative for chest pain, palpitations and leg swelling. Gastrointestinal: Negative for abdominal pain, blood in stool, constipation, diarrhea, nausea and vomiting. Endocrine: Negative for cold intolerance, heat intolerance, polydipsia and polyuria. Genitourinary: Negative for difficulty urinating, dysuria, flank pain, frequency, hematuria and urgency. Musculoskeletal: Negative for arthralgias, back pain, gait problem, joint swelling, myalgias, neck pain and neck stiffness. Skin: Positive for wound. Negative for pallor and rash. Allergic/Immunologic: Negative for environmental allergies, food allergies and immunocompromised state.    Neurological: Negative for dizziness, syncope, weakness, 59        No results found for this visit on 06/25/20. Physical Exam  Vitals signs and nursing note reviewed. Constitutional:       General: She is not in acute distress. Appearance: Normal appearance. She is not ill-appearing, toxic-appearing or diaphoretic. HENT:      Head: Normocephalic and atraumatic. Right Ear: Tympanic membrane, ear canal and external ear normal.      Left Ear: Tympanic membrane, ear canal and external ear normal.      Nose: Nose normal. No congestion or rhinorrhea. Mouth/Throat:      Mouth: Mucous membranes are moist.      Pharynx: Oropharynx is clear. No oropharyngeal exudate or posterior oropharyngeal erythema. Eyes:      Extraocular Movements: Extraocular movements intact. Conjunctiva/sclera: Conjunctivae normal.      Pupils: Pupils are equal, round, and reactive to light. Neck:      Musculoskeletal: Normal range of motion and neck supple. No neck rigidity or muscular tenderness. Cardiovascular:      Rate and Rhythm: Normal rate and regular rhythm. Pulses: Normal pulses. Heart sounds: Normal heart sounds. Pulmonary:      Effort: Pulmonary effort is normal. No respiratory distress. Breath sounds: Normal breath sounds. No stridor. No wheezing, rhonchi or rales. Chest:      Chest wall: No tenderness. Abdominal:      General: Abdomen is flat. Bowel sounds are normal. There is no distension. Palpations: Abdomen is soft. There is no mass. Tenderness: There is no abdominal tenderness. There is no right CVA tenderness, left CVA tenderness or guarding. Hernia: No hernia is present. Musculoskeletal: Normal range of motion. General: No swelling or tenderness. Right lower leg: No edema. Left lower leg: No edema. Lymphadenopathy:      Cervical: No cervical adenopathy. Skin:     General: Skin is warm and dry. Capillary Refill: Capillary refill takes less than 2 seconds. Coloration: Skin is not pale.

## 2020-07-27 ENCOUNTER — VIRTUAL VISIT (OUTPATIENT)
Dept: FAMILY MEDICINE CLINIC | Age: 30
End: 2020-07-27
Payer: COMMERCIAL

## 2020-07-27 PROCEDURE — 99213 OFFICE O/P EST LOW 20 MIN: CPT | Performed by: NURSE PRACTITIONER

## 2020-07-27 RX ORDER — ONDANSETRON 4 MG/1
4 TABLET, FILM COATED ORAL 3 TIMES DAILY PRN
Qty: 30 TABLET | Refills: 0 | Status: SHIPPED | OUTPATIENT
Start: 2020-07-27 | End: 2021-03-23

## 2020-07-27 ASSESSMENT — ENCOUNTER SYMPTOMS
BLOOD IN STOOL: 0
PHOTOPHOBIA: 0
COUGH: 0
SORE THROAT: 0
RHINORRHEA: 0
SINUS PRESSURE: 0
WHEEZING: 0
DIARRHEA: 0
SHORTNESS OF BREATH: 0
TROUBLE SWALLOWING: 0
EYE PAIN: 0
CHEST TIGHTNESS: 0
ABDOMINAL PAIN: 1
NAUSEA: 1
SINUS PAIN: 0
BACK PAIN: 0
CONSTIPATION: 0
VOMITING: 1

## 2020-07-27 NOTE — PROGRESS NOTES
2020    TELEHEALTH EVALUATION -- Audio/Visual (During EEMIL-32 public health emergency)    Due to COVID-19 related state of emergency restrictions , as an alternative to an in-person session, the clinical decision was made to utilize a virtual visit to provide services for this patient's visit. These services were provided via [employer]. me with the patient in their home while I was located at the office. Identity was confirmed via patient name and . Verbal consent for use of telehealth was provided to and completed by the patient. HPI:    Zbigniew Pi (:  1990) has requested an audio/video evaluation for the following concern(s):    Chief Complaint   Patient presents with    Abdominal Pain    Emesis       Abdominal Pain with vomiting:  She is being seen today for a day 1 history of n/v with abdominal cramping, dull stabbing pain in stomach, worse after she went to the bathroom, soft stool and foul smelling. Was at work, went to [employer] and heating pad, n/v after x's 3 at work. Vomiting last episode at 10 am,   Denies blood in stool or vomit. Epigastric pain, non-radiating. Stool burns coming out. Ate at 0530 breakfast shake and a breakfast bar and pain began 0730    LMP: 2 weeks ago  Fever, chills, vaginal discharge, dysuria, frequency, cough, or sob. Ill contacts: Sister has been sick with [place] stuff 3 weeks ago), Son was ill last week with cough, abdominal pain. Work note: Ashia Wolf requires 3 days off   [email or web address]    No problem-specific Assessment & Plan notes found for this encounter. Review of Systems   Constitutional: Negative for activity change, appetite change, chills, diaphoresis, fatigue, fever and unexpected weight change. HENT: Negative for congestion, dental problem, ear pain, hearing loss, mouth sores, nosebleeds, postnasal drip, rhinorrhea, sinus pressure, sinus pain, sore throat, tinnitus and trouble swallowing.     Eyes: Negative for photophobia, pain and visual disturbance. Respiratory: Negative for cough, chest tightness, shortness of breath and wheezing. Cardiovascular: Negative for chest pain, palpitations and leg swelling. Gastrointestinal: Positive for abdominal pain, nausea and vomiting. Negative for blood in stool, constipation and diarrhea. Endocrine: Negative for cold intolerance, heat intolerance, polydipsia and polyuria. Genitourinary: Negative for decreased urine volume, difficulty urinating, dysuria, flank pain, frequency, hematuria, menstrual problem, pelvic pain, urgency, vaginal bleeding, vaginal discharge and vaginal pain. Musculoskeletal: Negative for arthralgias, back pain, gait problem, joint swelling, myalgias, neck pain and neck stiffness. Skin: Negative for pallor, rash and wound. Allergic/Immunologic: Negative for environmental allergies, food allergies and immunocompromised state. Neurological: Negative for dizziness, syncope, weakness, light-headedness, numbness and headaches. Hematological: Negative for adenopathy. Does not bruise/bleed easily. Psychiatric/Behavioral: Negative for confusion, decreased concentration, dysphoric mood, self-injury, sleep disturbance and suicidal ideas. The patient is not nervous/anxious. Prior to Visit Medications    Medication Sig Taking? Authorizing Provider   cetirizine (ZYRTEC) 10 MG tablet Take 10 mg by mouth daily Yes Historical Provider, MD   fluticasone (VERAMYST) 27.5 MCG/SPRAY nasal spray 2 sprays by Each Nostril route daily Yes Historical Provider, MD   Multiple Vitamins-Minerals (MULTI FOR HER PO) Take  by mouth.  Yes Historical Provider, MD       Allergies   Allergen Reactions    Amoxicillin Hives and Swelling       Social History     Tobacco Use    Smoking status: Never Smoker    Smokeless tobacco: Never Used   Substance Use Topics    Alcohol use: Yes     Comment: Rare    Drug use: No      Past Medical History:   Diagnosis Date    ADHD (attention deficit hyperactivity disorder)     Bronchitis     Chronic back pain     H/O Doppler ultrasound (Bilateral Carotids) 05/24/2018    Normal vertebral flow. No hemodynamically significant stenosis noted in the internal carotid artery bilaterally.  H/O echocardiogram 05/24/2018    Normal study. EF 55-60%.  H/O exercise stress test 05/17/2018    treadmill    Pertussis     Seasonal allergies     Syncope      Past Surgical History:   Procedure Laterality Date    WISDOM TOOTH EXTRACTION           PHYSICAL EXAMINATION:    Vital Signs: (As obtained by patient/caregiver or practitioner observation)    Blood pressure-  Heart rate-    Respiratory rate-    Temperature-  Pulse oximetry-     Physical Exam  Constitutional:       General: She is not in acute distress. HENT:      Head: Normocephalic and atraumatic. Neck:      Musculoskeletal: Normal range of motion. Pulmonary:      Effort: Pulmonary effort is normal. No respiratory distress. Abdominal:      Tenderness: There is abdominal tenderness. Comments: Epigastric tenderness per patient self-palpation   Neurological:      Mental Status: She is alert and oriented to person, place, and time. Psychiatric:         Mood and Affect: Mood normal.         Behavior: Behavior normal.         Thought Content: Thought content normal.         Judgment: Judgment normal.         Other pertinent observable physical exam findings-     Due to this being a TeleHealth encounter, evaluation of the following organ systems is limited: Vitals/Constitutional/EENT/Resp/CV/GI//MS/Neuro/Skin/Heme-Lymph-Imm. ASSESSMENT/PLAN:    1. Epigastric pain  - Recommended rehydration w/gatorade and or pedialyte. Discussed s/s dehydration and when to seek further medical attention  - Encouraged small, frequent bland meals. Discussed possible benefits of BRAT diet. - Discussed advantages and disadvantages of antiemetic agents. Encouraged pt to use with caution.  Pt verbalizes understanding.   -

## 2020-07-27 NOTE — LETTER
[facility] [address] [place] [id number]  Phone: [phone]  Fax: [phone]    MEGHAN Kate CNP        July 27, 2020     Patient: Akash Mclain   YOB: 1990   Date of Visit: 7/27/2020       To Whom it May Concern:    Carly Rosenberg was seen in my clinic on 7/27/2020. She may return to work on 7/30/20 with no restrictions. If you have any questions or concerns, please don't hesitate to call.     Sincerely,         MEGHAN Kate CNP

## 2020-12-15 ENCOUNTER — HOSPITAL ENCOUNTER (OUTPATIENT)
Age: 30
Setting detail: SPECIMEN
Discharge: HOME | End: 2020-12-15
Payer: COMMERCIAL

## 2020-12-15 ENCOUNTER — OFFICE VISIT (OUTPATIENT)
Dept: PRIMARY CARE CLINIC | Age: 30
End: 2020-12-15
Payer: COMMERCIAL

## 2020-12-15 VITALS — HEART RATE: 91 BPM | OXYGEN SATURATION: 100 % | TEMPERATURE: 98 F

## 2020-12-15 LAB — S PYO AG THROAT QL: NORMAL

## 2020-12-15 PROCEDURE — 87880 STREP A ASSAY W/OPTIC: CPT | Performed by: NURSE PRACTITIONER

## 2020-12-15 PROCEDURE — 99213 OFFICE O/P EST LOW 20 MIN: CPT | Performed by: NURSE PRACTITIONER

## 2020-12-15 PROCEDURE — U0002 COVID-19 LAB TEST NON-CDC: HCPCS

## 2020-12-15 NOTE — PATIENT INSTRUCTIONS
Your COVID 19 test can take 3-5 days for the results come back. We ask that you make a Mychart page and view your test results this way. You will need to Self quarantine until you know your results. Increase fluids rest  Saline nasal spray as directed  Warm salt gargles for throat discomfort  Monitor temperature twice a day  Tylenol for fevers and/or discomfort. If symptoms are worse -Go to the ER. Follow up with your primary doctor    To Whom it May Concern:    Akhil Silver has been tested for COVID on 12/15/20. They may NOT return to work until their lab test results back and they been fever free for 3 days. If test is positive they must stay home for 2 weeks or until they test negative or as directed by the [facility] Department. Strep test is negative.

## 2020-12-15 NOTE — PROGRESS NOTES
12/15/20  Diony Molina  1990    FLU/COVID-19 CLINIC EVALUATION    HPI SYMPTOMS:    Employer: [employer]    [] Fevers  [] Chills  [] Cough  [] Coughing up blood  [] Chest Congestion  [x] Nasal Congestion  [] Feeling short of breath  [] Sometimes  [] Frequently  [] All the time  [] Chest pain  [] Headaches  []Tolerable  [] Severe  [x] Sore throat  [] Muscle aches  [] Nausea  [] Vomiting  []Unable to keep fluids down  [] Diarrhea  []Severe    [x] OTHER SYMPTOMS:  L ear pain  Symptom Duration:   [x] 1  [] 2   [] 3   [] 4    [] 5   [] 6   [] 7   [] 8   [] 9   [] 10   [] 11   [] 12   [] 13   [] 14   [] Longer than 14 days    Symptom course:   [] Worsening     [x] Stable     [] Improving    RISK FACTORS:    [] Pregnant or possibly pregnant  [] Age over 61  [] Diabetes  [] Heart disease  [] Asthma  [] COPD/Other chronic lung diseases  [] Active Cancer  [] On Chemotherapy  [] Taking oral steroids  [] History Lymphoma/Leukemia  [] Close contact with a lab confirmed COVID-19 patient within 14 days of symptom onset  [] History of travel from affected geographical areas within 14 days of symptom onset       VITALS:  There were no vitals filed for this visit. TESTS:    POCT FLU:  [] Positive     []Negative    ASSESSMENT:    [] Flu  [] Possible COVID-19  [] Strep    PLAN:    [] Discharge home with written instructions for:  [] Flu management  [] Possible COVID-19 infection with self-quarantine and management of symptoms  [] Follow-up with primary care physician or emergency department if worsens  [] Evaluation per physician/NP/PA in clinic  [] Sent to ER       An  electronic signature was used to authenticate this note.      --Stephane Matamoros LPN on [id number]/6114 at 4:20 PM

## 2020-12-15 NOTE — PROGRESS NOTES
12/15/2020    HPI:  Chief complaint and history of present illness as per medical assistant/nurse documented today in the Flu/COVID-19 clinic. MEDICATIONS:  Prior to Visit Medications    Medication Sig Taking? Authorizing Provider   ondansetron (ZOFRAN) 4 MG tablet Take 1 tablet by mouth 3 times daily as needed for Nausea or Vomiting  MEGHAN Morejon CNP   cetirizine (ZYRTEC) 10 MG tablet Take 10 mg by mouth daily  Historical Provider, MD   fluticasone (VERAMYST) 27.5 MCG/SPRAY nasal spray 2 sprays by Each Nostril route daily  Historical Provider, MD   Multiple Vitamins-Minerals (MULTI FOR HER PO) Take  by mouth. Historical Provider, MD       Allergies   Allergen Reactions    Amoxicillin Hives and Swelling   ,   Past Medical History:   Diagnosis Date    ADHD (attention deficit hyperactivity disorder)     Bronchitis     Chronic back pain     H/O Doppler ultrasound (Bilateral Carotids) 05/24/2018    Normal vertebral flow. No hemodynamically significant stenosis noted in the internal carotid artery bilaterally.  H/O echocardiogram 05/24/2018    Normal study. EF 55-60%.     H/O exercise stress test 05/17/2018    treadmill    Pertussis     Seasonal allergies     Syncope    ,   Past Surgical History:   Procedure Laterality Date    WISDOM TOOTH EXTRACTION     ,   Social History     Tobacco Use    Smoking status: Never Smoker    Smokeless tobacco: Never Used   Substance Use Topics    Alcohol use: Yes     Comment: Rare    Drug use: No   ,   Family History   Problem Relation Age of Onset    Arthritis Mother     No Known Problems Father     Cancer Maternal Grandmother     Alzheimer's Disease Paternal Grandmother     Cancer Paternal Grandfather    ,   Immunization History   Administered Date(s) Administered    Influenza Vaccine, unspecified formulation 10/28/2015    Influenza, Quadv, IM, (6 mo and older Fluzone, Flulaval, Fluarix and 3 yrs and older Afluria) 11/01/2016  Influenza, Quadv, IM, PF (6 mo and older Fluzone, Flulaval, Fluarix, and 3 yrs and older Afluria) 11/28/2018    Tdap (Boostrix, Adacel) 10/26/2015   ,   Health Maintenance   Topic Date Due    Varicella vaccine (1 of 2 - 2-dose childhood series) 12/02/1993    Flu vaccine (1) 09/01/2020    Cervical cancer screen  11/22/2022    DTaP/Tdap/Td vaccine (2 - Td) 10/26/2025    HIV screen  Completed    Hepatitis A vaccine  Aged Out    Hepatitis B vaccine  Aged Out    Hib vaccine  Aged Out    Meningococcal (ACWY) vaccine  Aged Out    Pneumococcal 0-64 years Vaccine  Aged Out       PHYSICAL EXAM:  Physical Exam  Constitutional:       Appearance: Normal appearance. HENT:      Head: Normocephalic. Right Ear: Tympanic membrane, ear canal and external ear normal.      Left Ear: Tympanic membrane, ear canal and external ear normal.      Nose: Congestion present. Mouth/Throat:      Lips: Pink. Mouth: Mucous membranes are moist.      Pharynx: Posterior oropharyngeal erythema present. Neck:      Musculoskeletal: Neck supple. Cardiovascular:      Rate and Rhythm: Normal rate and regular rhythm. Heart sounds: Normal heart sounds. Pulmonary:      Effort: Pulmonary effort is normal.      Breath sounds: Normal breath sounds. Skin:     General: Skin is warm and dry. Neurological:      Mental Status: She is alert and oriented to person, place, and time. Psychiatric:         Mood and Affect: Mood normal.         Behavior: Behavior normal.         ASSESSMENT/PLAN:  1. Sore throat  Strep test is negative. Your COVID 19 test can take 3-5 days for the results come back. We ask that you make a Mychart page and view your test results this way. You will need to Self quarantine until you know your results. Increase fluids rest  Saline nasal spray as directed  Warm salt gargles for throat discomfort  Monitor temperature twice a day  Tylenol for fevers and/or discomfort. If symptoms are worse -Go to the ER. Follow up with your primary doctor    To Whom it May Concern:    Vanessa Fofana has been tested for COVID on 12/15/20. They may NOT return to work until their lab test results back and they been fever free for 3 days. If test is positive they must stay home for 2 weeks or until they test negative or as directed by the [facility] Department. - COVID-19 Ambulatory  - POCT Rapid Strep A    2. Nasal congestion    - COVID-19 Ambulatory      FOLLOW-UP:  Return if symptoms worsen or fail to improve.     In addition to other information, the printed after visit summary provided to the patient includes:  [x] COVID-19 Self care instructions  [x] COVID-19 General patient information

## 2020-12-17 LAB
SARS-COV-2: NOT DETECTED
SOURCE: NORMAL

## 2021-03-23 ENCOUNTER — OFFICE VISIT (OUTPATIENT)
Dept: FAMILY MEDICINE CLINIC | Age: 31
End: 2021-03-23
Payer: COMMERCIAL

## 2021-03-23 VITALS
OXYGEN SATURATION: 98 % | HEART RATE: 69 BPM | WEIGHT: 274 LBS | HEIGHT: 64 IN | BODY MASS INDEX: 46.78 KG/M2 | DIASTOLIC BLOOD PRESSURE: 72 MMHG | TEMPERATURE: 98 F | SYSTOLIC BLOOD PRESSURE: 108 MMHG

## 2021-03-23 DIAGNOSIS — R51.9 NONINTRACTABLE HEADACHE, UNSPECIFIED CHRONICITY PATTERN, UNSPECIFIED HEADACHE TYPE: ICD-10-CM

## 2021-03-23 DIAGNOSIS — R60.9 DEPENDENT EDEMA: ICD-10-CM

## 2021-03-23 DIAGNOSIS — Z30.09 BIRTH CONTROL COUNSELING: ICD-10-CM

## 2021-03-23 DIAGNOSIS — E66.01 MORBID OBESITY DUE TO EXCESS CALORIES (HCC): Primary | ICD-10-CM

## 2021-03-23 DIAGNOSIS — B35.1 ONYCHOMYCOSIS OF TOENAIL: ICD-10-CM

## 2021-03-23 DIAGNOSIS — E66.01 MORBID OBESITY DUE TO EXCESS CALORIES (HCC): ICD-10-CM

## 2021-03-23 DIAGNOSIS — L68.0 HIRSUTISM: ICD-10-CM

## 2021-03-23 PROBLEM — R55 SYNCOPE AND COLLAPSE: Status: ACTIVE | Noted: 2018-05-08

## 2021-03-23 LAB
A/G RATIO: 1.4 (ref 1.1–2.2)
ALBUMIN SERPL-MCNC: 4.3 G/DL (ref 3.4–5)
ALP BLD-CCNC: 94 U/L (ref 40–129)
ALT SERPL-CCNC: 12 U/L (ref 10–40)
ANION GAP SERPL CALCULATED.3IONS-SCNC: 12 MMOL/L (ref 3–16)
AST SERPL-CCNC: 15 U/L (ref 15–37)
BASOPHILS ABSOLUTE: 0 K/UL (ref 0–0.2)
BASOPHILS RELATIVE PERCENT: 0.3 %
BILIRUB SERPL-MCNC: 0.3 MG/DL (ref 0–1)
BUN BLDV-MCNC: 13 MG/DL (ref 7–20)
CALCIUM SERPL-MCNC: 9.4 MG/DL (ref 8.3–10.6)
CHLORIDE BLD-SCNC: 104 MMOL/L (ref 99–110)
CHOLESTEROL, TOTAL: 170 MG/DL (ref 0–199)
CO2: 24 MMOL/L (ref 21–32)
CREAT SERPL-MCNC: 0.9 MG/DL (ref 0.6–1.1)
EOSINOPHILS ABSOLUTE: 0.2 K/UL (ref 0–0.6)
EOSINOPHILS RELATIVE PERCENT: 2.2 %
FOLLICLE STIMULATING HORMONE: 4.3 MIU/ML
GFR AFRICAN AMERICAN: >60
GFR NON-AFRICAN AMERICAN: >60
GLOBULIN: 3 G/DL
GLUCOSE BLD-MCNC: 94 MG/DL (ref 70–99)
HCT VFR BLD CALC: 40.5 % (ref 36–48)
HDLC SERPL-MCNC: 51 MG/DL (ref 40–60)
HEMOGLOBIN: 13.8 G/DL (ref 12–16)
HEPATITIS C ANTIBODY INTERPRETATION: NORMAL
IRON SATURATION: 23 % (ref 15–50)
IRON: 78 UG/DL (ref 37–145)
LDL CHOLESTEROL CALCULATED: 98 MG/DL
LUTEINIZING HORMONE: 7.9 MIU/ML
LYMPHOCYTES ABSOLUTE: 2.2 K/UL (ref 1–5.1)
LYMPHOCYTES RELATIVE PERCENT: 32.5 %
MCH RBC QN AUTO: 29.2 PG (ref 26–34)
MCHC RBC AUTO-ENTMCNC: 34 G/DL (ref 31–36)
MCV RBC AUTO: 85.9 FL (ref 80–100)
MONOCYTES ABSOLUTE: 0.3 K/UL (ref 0–1.3)
MONOCYTES RELATIVE PERCENT: 5.1 %
NEUTROPHILS ABSOLUTE: 4 K/UL (ref 1.7–7.7)
NEUTROPHILS RELATIVE PERCENT: 59.9 %
PDW BLD-RTO: 13.8 % (ref 12.4–15.4)
PLATELET # BLD: 254 K/UL (ref 135–450)
PMV BLD AUTO: 8.7 FL (ref 5–10.5)
POTASSIUM SERPL-SCNC: 4.6 MMOL/L (ref 3.5–5.1)
PROLACTIN: 7.7 NG/ML
RBC # BLD: 4.71 M/UL (ref 4–5.2)
SODIUM BLD-SCNC: 140 MMOL/L (ref 136–145)
TOTAL IRON BINDING CAPACITY: 332 UG/DL (ref 260–445)
TOTAL PROTEIN: 7.3 G/DL (ref 6.4–8.2)
TRIGL SERPL-MCNC: 104 MG/DL (ref 0–150)
TSH REFLEX FT4: 2.44 UIU/ML (ref 0.27–4.2)
VITAMIN D 25-HYDROXY: 24.2 NG/ML
VLDLC SERPL CALC-MCNC: 21 MG/DL
WBC # BLD: 6.7 K/UL (ref 4–11)

## 2021-03-23 PROCEDURE — 99214 OFFICE O/P EST MOD 30 MIN: CPT | Performed by: PHYSICIAN ASSISTANT

## 2021-03-23 RX ORDER — NORGESTIMATE AND ETHINYL ESTRADIOL 0.25-0.035
1 KIT ORAL DAILY
Qty: 1 PACKET | Refills: 3 | Status: SHIPPED | OUTPATIENT
Start: 2021-03-23 | End: 2021-07-06

## 2021-03-23 ASSESSMENT — ENCOUNTER SYMPTOMS
CHEST TIGHTNESS: 0
COLOR CHANGE: 0
SORE THROAT: 0
EYE PAIN: 0
CONSTIPATION: 0
ROS SKIN COMMENTS: TOENAIL FUNGUS
DIARRHEA: 0
PHOTOPHOBIA: 0
EYE REDNESS: 0
RHINORRHEA: 0
EYE DISCHARGE: 0
NAUSEA: 0
WHEEZING: 0
SHORTNESS OF BREATH: 0
COUGH: 0
BACK PAIN: 0
BLOOD IN STOOL: 0
ABDOMINAL PAIN: 0
VOMITING: 0

## 2021-03-23 ASSESSMENT — PATIENT HEALTH QUESTIONNAIRE - PHQ9
SUM OF ALL RESPONSES TO PHQ QUESTIONS 1-9: 0
SUM OF ALL RESPONSES TO PHQ9 QUESTIONS 1 & 2: 0
SUM OF ALL RESPONSES TO PHQ QUESTIONS 1-9: 0
2. FEELING DOWN, DEPRESSED OR HOPELESS: 0

## 2021-03-23 NOTE — PROGRESS NOTES
Maribel Upton (:  1990) is a 31 y.o. female,Established patient, here for evaluation of the following chief complaint(s):    Establish Care, Contraception (would like to start birth control), and Other (would like labs drawn for hormones due to dark hair growth on face and chest)    This is my first patient encounter with Maribel Upton; previous PCP was Lorenzo Hernandez CNP ; chart review was completed. Patient would like a female provider from this point forward. SUBJECTIVE/OBJECTIVE:  HPI  Maribel Upton is a pleasant 31 y.o. female presenting to clinic today for follow up and establish care with new provider. Hirsutism-patient reports increase in facial hair and chest hair over the past 1 year, worsening over the past 3 to 4 months; patient states that since birth of child several years ago she has had regular cycles with much heavier bleeding and increased abdominal cramping pains every month. Patient is inquiring about restarting birth control. Patient was previously on Berrybenka and this was well-tolerated. Patient was on Depo Provera injections and reports significant side effects, is not interested in this. Patient reports family history of PCOS in aunts and cousins; and abnormal uterine bleeding and mother. Headaches - x 6 months once or twice per week. Sleep/ ibuprofen/ hot shower to relieve headaches. Patient denies vision changes or hemianopsia or nipple discharge. Last PAP done 2019 - negative for abnormal cells, GC/BV/Trich/Gardnerella swabs negative. Patient's OB/GYN no longer works in the area and patient would like to continue with her women's health care here. Obesity - patient has had success with ketogenic diet in the past.  Has been unable to lose weight after last pregnancy.     Toenail Fungus - patient is interested in terbinafine in future, but would like to have her hirsutism and heavy bleeding figured out first.  Has tried topical remedies in the past without success. Dependent edema -patient reports leg swelling with activities throughout the day at work and improvement with rest and leg elevation. Current Outpatient Medications   Medication Sig Dispense Refill    norgestimate-ethinyl estradiol (ORTHO-CYCLEN, 28,) 0.25-35 MG-MCG per tablet Take 1 tablet by mouth daily 1 packet 3    cetirizine (ZYRTEC) 10 MG tablet Take 10 mg by mouth daily      fluticasone (VERAMYST) 27.5 MCG/SPRAY nasal spray 2 sprays by Each Nostril route daily      Multiple Vitamins-Minerals (MULTI FOR HER PO) Take  by mouth. No current facility-administered medications for this visit. Review of Systems   Constitutional: Negative for appetite change, chills, fatigue and fever. HENT: Negative for congestion, ear pain, hearing loss, rhinorrhea and sore throat. Eyes: Negative for photophobia, pain, discharge and redness. Respiratory: Negative for cough, chest tightness, shortness of breath and wheezing. Cardiovascular: Negative for chest pain, palpitations and leg swelling. Gastrointestinal: Negative for abdominal pain, blood in stool, constipation, diarrhea, nausea and vomiting. Endocrine: Negative for polyuria. Hirsutism /facial and chest hair   Genitourinary: Negative for difficulty urinating, dysuria, flank pain, frequency, hematuria and urgency. Musculoskeletal: Negative for arthralgias, back pain, gait problem and joint swelling. Skin: Negative for color change and rash. Toenail fungus     Neurological: Positive for headaches. Negative for dizziness, syncope, weakness and light-headedness. Hematological: Negative for adenopathy. Psychiatric/Behavioral: Negative for agitation, behavioral problems and suicidal ideas. The patient is not nervous/anxious. Physical Exam  Vitals signs and nursing note reviewed. Constitutional:       General: She is not in acute distress. Appearance: She is obese. She is not ill-appearing. HENT:      Head: Normocephalic and atraumatic. Right Ear: Tympanic membrane and external ear normal.      Left Ear: Tympanic membrane and external ear normal.      Nose: No congestion or rhinorrhea. Mouth/Throat:      Mouth: Mucous membranes are moist.      Pharynx: No oropharyngeal exudate or posterior oropharyngeal erythema. Neck:      Musculoskeletal: Normal range of motion. No neck rigidity. Vascular: No carotid bruit. Cardiovascular:      Rate and Rhythm: Normal rate. Pulses: Normal pulses. Pulmonary:      Effort: Pulmonary effort is normal.   Abdominal:      Palpations: Abdomen is soft. Musculoskeletal: Normal range of motion. Right lower leg: Edema ( Nonpitting edema) present. Left lower leg: Edema ( Nonpitting edema) present. Skin:     General: Skin is warm and dry. Capillary Refill: Capillary refill takes less than 2 seconds. Comments: Significant fine hairs over patient's face with scattered dark black hairs. Few dark black hairs over patient's chest, patient reports plucking these regularly   Neurological:      Mental Status: She is alert and oriented to person, place, and time. Mental status is at baseline. Psychiatric:         Mood and Affect: Mood normal.         ASSESSMENT/PLAN:  1. Morbid obesity due to excess calories [facility])   -The patient is asked to make an attempt to improve diet and exercise patterns to aid in medical management of this problem. -Patient has had success with ketogenic diet in the past with relapse of weight gain after reconsuming carbohydrates, would recommend ketogenic lifestyle to maintain weight loss in the future. -     HEMOGLOBIN A1C; Future  -     HEPATITIS C ANTIBODY; Future  -     CBC Auto Differential; Future  -     Comprehensive Metabolic Panel; Future  -     TSH WITH REFLEX TO FT4; Future  -     Vitamin D 25 Hydroxy; Future  -     LIPID PANEL; Future  -     INSULIN FREE & TOTAL; Future  2.  Birth control counseling   -Patient has well tolerated Sprintec in the past; will restart this. Discussed risks of combined contraceptive and counseled patient against smoking etc. increased risk of blood clots. -     norgestimate-ethinyl estradiol (ORTHO-CYCLEN, 28,) 0.25-35 MG-MCG per tablet; Take 1 tablet by mouth daily, Disp-1 packet, R-3Normal  3. Hirsutism   -Discussed possible underlying etiologies with patient. Will assess hormone levels and metabolic function.   -No abdominal pains to suggest presence of cysts at this time; however patient does have family history of PCOS and other uterine issues.  -     HEMOGLOBIN A1C; Future  -     TSH WITH REFLEX TO FT4; Future  -     Vitamin D 25 Hydroxy; Future  -     LIPID PANEL; Future  -     Testosterone, free, total; Future  -     PROLACTIN; Future  -     FOLLICLE STIMULATING HORMONE; Future  -     LUTEINIZING HORMONE; Future  -     INSULIN FREE & TOTAL; Future  -     IRON AND TIBC; Future  4. Dependent edema   -Likely related to weight/body habitus. Normotensive today in office and no symptoms of heart failure.   -Discussed use of compression stockings to aid in vascular return and prevent progression of edema and skin breakdown etc.  -     Comprehensive Metabolic Panel; Future  5. Onychomycosis of toenail   -Patient would like to be treated with terbinafine in the future if possible, but would like to get women's health issues under control first.  6. Nonintractable headache, unspecified chronicity pattern, unspecified headache type   -Likely tension type headaches related to stress etc. symptoms are relieved with ibuprofen and relaxation techniques. We will get prolactin to rule out pituitary cause. -     PROLACTIN; Future      Return in about 4 weeks (around 4/20/2021), or if symptoms worsen or fail to improve, for Follow Up. An electronic signature was used to authenticate this note.     --REGINA Sparks

## 2021-03-24 LAB
ESTIMATED AVERAGE GLUCOSE: 93.9 MG/DL
HBA1C MFR BLD: 4.9 %

## 2021-03-25 LAB
INSULIN FREE: 20 UIU/ML (ref 3–19)
INSULIN: 25 UIU/ML (ref 3–19)
SEX HORMONE BINDING GLOBULIN: 31 NMOL/L (ref 30–135)
TESTOSTERONE FREE-NONMALE: 4.6 PG/ML (ref 0.8–7.4)
TESTOSTERONE TOTAL: 25 NG/DL (ref 20–70)

## 2021-04-01 ENCOUNTER — TELEPHONE (OUTPATIENT)
Dept: FAMILY MEDICINE CLINIC | Age: 31
End: 2021-04-01

## 2021-04-01 NOTE — TELEPHONE ENCOUNTER
Client called to discuss labs and plan of care with review and assistance with MY chart so client would be able to obtain information thru [facility] chart.

## 2021-07-03 DIAGNOSIS — Z30.09 BIRTH CONTROL COUNSELING: ICD-10-CM

## 2021-07-06 RX ORDER — NORGESTIMATE AND ETHINYL ESTRADIOL 0.25-0.035
KIT ORAL
Qty: 84 TABLET | Refills: 1 | Status: SHIPPED | OUTPATIENT
Start: 2021-07-06 | End: 2022-01-05

## 2021-08-04 ENCOUNTER — HOSPITAL ENCOUNTER (OUTPATIENT)
Dept: GENERAL RADIOLOGY | Age: 31
Discharge: HOME | End: 2021-08-04
Payer: COMMERCIAL

## 2021-08-04 ENCOUNTER — OFFICE VISIT (OUTPATIENT)
Dept: FAMILY MEDICINE CLINIC | Age: 31
End: 2021-08-04
Payer: COMMERCIAL

## 2021-08-04 ENCOUNTER — HOSPITAL ENCOUNTER (OUTPATIENT)
Age: 31
Discharge: HOME | End: 2021-08-04
Payer: COMMERCIAL

## 2021-08-04 VITALS
TEMPERATURE: 96.8 F | OXYGEN SATURATION: 99 % | HEART RATE: 83 BPM | WEIGHT: 281.4 LBS | RESPIRATION RATE: 18 BRPM | SYSTOLIC BLOOD PRESSURE: 100 MMHG | DIASTOLIC BLOOD PRESSURE: 68 MMHG | BODY MASS INDEX: 48.3 KG/M2

## 2021-08-04 DIAGNOSIS — M54.50 ACUTE MIDLINE LOW BACK PAIN WITHOUT SCIATICA: Primary | ICD-10-CM

## 2021-08-04 DIAGNOSIS — M54.50 ACUTE MIDLINE LOW BACK PAIN WITHOUT SCIATICA: ICD-10-CM

## 2021-08-04 PROCEDURE — 72100 X-RAY EXAM L-S SPINE 2/3 VWS: CPT

## 2021-08-04 PROCEDURE — 99213 OFFICE O/P EST LOW 20 MIN: CPT | Performed by: PHYSICIAN ASSISTANT

## 2021-08-04 RX ORDER — PREDNISONE 20 MG/1
40 TABLET ORAL DAILY
COMMUNITY
Start: 2021-08-03 | End: 2021-08-08

## 2021-08-04 RX ORDER — CYCLOBENZAPRINE HCL 10 MG
10 TABLET ORAL 3 TIMES DAILY PRN
COMMUNITY
Start: 2021-08-03 | End: 2021-08-08

## 2021-08-04 RX ORDER — LORATADINE 10 MG/1
10 TABLET ORAL DAILY
COMMUNITY

## 2021-08-04 ASSESSMENT — PATIENT HEALTH QUESTIONNAIRE - PHQ9
SUM OF ALL RESPONSES TO PHQ QUESTIONS 1-9: 0
SUM OF ALL RESPONSES TO PHQ9 QUESTIONS 1 & 2: 0
SUM OF ALL RESPONSES TO PHQ QUESTIONS 1-9: 0
1. LITTLE INTEREST OR PLEASURE IN DOING THINGS: 0
2. FEELING DOWN, DEPRESSED OR HOPELESS: 0
SUM OF ALL RESPONSES TO PHQ QUESTIONS 1-9: 0

## 2021-08-04 ASSESSMENT — ENCOUNTER SYMPTOMS
BLOOD IN STOOL: 0
WHEEZING: 0
EYE PAIN: 0
ABDOMINAL PAIN: 0
NAUSEA: 0
CONSTIPATION: 0
COUGH: 0
SORE THROAT: 0
PHOTOPHOBIA: 0
COLOR CHANGE: 0
DIARRHEA: 0
RHINORRHEA: 0
CHEST TIGHTNESS: 0
EYE REDNESS: 0
EYE DISCHARGE: 0
VOMITING: 0
SHORTNESS OF BREATH: 0
BACK PAIN: 1

## 2021-08-04 NOTE — PROGRESS NOTES
Nisa Jade (:  1990) is a 31 y.o. female,Established patient, here for evaluation of the following chief complaint(s):    Back Pain (patient is having mid back pain that radiates on both sides symptoms started on on 8/3/21 was seen at [facility] urgent care she woke up yesterday morning her back felt tight unable to bend over without pain)      SUBJECTIVE/OBJECTIVE:  HPI  Nisa Jade is a pleasant 31 y.o. female presenting to clinic today for low back pain. Back Pain - patient was seen at urgent care yesterday with complaints of of 1 day of left lower back pain; patient does report history of chronic low back issues with intermittent low back spasm and left sciatica, however patient states flareups have never been this significant; patient reports that pain flared up after she bent down to pick something up and felt it immediately locked up. Patient was given prescription for muscle relaxer, prednisone and given Toradol injection at walk-in clinic yesterday. Patient reports that she continues with back pain today; patient reports that she was able to sleep 12 hours last night after taking muscle relaxer. Patient reports pain is worsened with bending over or any postural changes; patient reports that only comfort position is sitting upright erect. Patient reports that she has had ongoing intermittent back issues ever since the birth of her child approximately 5 to 6 years ago.     Patient denies any saddle anesthesias, bladder or bowel incontinence, fever etc.    Current Outpatient Medications   Medication Sig Dispense Refill    cyclobenzaprine (FLEXERIL) 10 MG tablet Take 10 mg by mouth 3 times daily as needed      predniSONE (DELTASONE) 20 MG tablet Take 40 mg by mouth daily      loratadine (CLARITIN) 10 MG tablet Take 10 mg by mouth daily      GRAHAM 0.25-35 MG-MCG per tablet TAKE 1 TABLET BY MOUTH EVERY DAY 84 tablet 1    fluticasone (VERAMYST) 27.5 MCG/SPRAY nasal spray 2 sprays by Each Nostril route daily      Multiple Vitamins-Minerals (MULTI FOR HER PO) Take  by mouth.  cetirizine (ZYRTEC) 10 MG tablet Take 10 mg by mouth daily (Patient not taking: Reported on 8/4/2021)       No current facility-administered medications for this visit. Review of Systems   Constitutional: Negative for appetite change, chills, fatigue and fever. HENT: Negative for congestion, ear pain, hearing loss, rhinorrhea and sore throat. Eyes: Negative for photophobia, pain, discharge and redness. Respiratory: Negative for cough, chest tightness, shortness of breath and wheezing. Cardiovascular: Negative for chest pain, palpitations and leg swelling. Gastrointestinal: Negative for abdominal pain, blood in stool, constipation, diarrhea, nausea and vomiting. Endocrine: Negative for polyuria. Genitourinary: Negative for difficulty urinating, dysuria, flank pain, frequency, hematuria and urgency. Musculoskeletal: Positive for back pain. Negative for gait problem and joint swelling. Skin: Negative for color change and rash. Neurological: Negative for dizziness, syncope, weakness and light-headedness. Hematological: Negative for adenopathy. Psychiatric/Behavioral: Negative for agitation, behavioral problems and suicidal ideas. The patient is not nervous/anxious. Physical Exam  Vitals and nursing note reviewed. Constitutional:       General: She is not in acute distress. Appearance: She is obese. She is not ill-appearing. HENT:      Head: Normocephalic and atraumatic. Right Ear: Tympanic membrane and external ear normal.      Left Ear: Tympanic membrane and external ear normal.      Nose: No congestion or rhinorrhea. Mouth/Throat:      Mouth: Mucous membranes are moist.      Pharynx: No oropharyngeal exudate or posterior oropharyngeal erythema. Neck:      Vascular: No carotid bruit. Cardiovascular:      Rate and Rhythm: Normal rate.       Pulses: Normal pulses. Pulmonary:      Effort: Pulmonary effort is normal.   Abdominal:      Palpations: Abdomen is soft. Musculoskeletal:         General: Normal range of motion. Cervical back: Normal range of motion. No rigidity. Right lower leg: Right lower leg edema:  Nonpitting edema. Left lower leg: Left lower leg edema:  Nonpitting edema. Comments: Low back pain midline and left side TTP at approx L1-L4   Skin:     General: Skin is warm and dry. Capillary Refill: Capillary refill takes less than 2 seconds. Neurological:      Mental Status: She is alert and oriented to person, place, and time. Mental status is at baseline. Psychiatric:         Mood and Affect: Mood normal.         ASSESSMENT/PLAN:  1. Acute midline low back pain without sciatica   -Based on patient's history of ongoing low back issues for the past 5 years and episodic flareups; will obtain lumbar x-ray and refer for physical therapy. Pending x-ray and PT; can consider MRI or referral to specialist.   -Advised patient against prolonged bedrest; recommend activity as tolerated; avoid activities which may worsen with issue. Advised on supportive care strategies including heating pad, local massage if tolerable etc.   -Continue with steroid and muscle relaxer as previously prescribed; advised patient that she can initiate NSAID once complete with steroid if she continues to experience pain; patient can reach out for refill of muscle relaxer if warranted. -Work note provided for patient today; follow-up in 1 week to assess progress. -     XR LUMBAR SPINE (2-3 VIEWS); Future  -     [address]      Return in about 1 week (around 8/11/2021), or if symptoms worsen or fail to improve, for Follow Up. An electronic signature was used to authenticate this note.     --REGINA Issa

## 2021-08-04 NOTE — LETTER
[facility] AT [facility] [address] [place] [id number]  Phone: [phone]  Fax: [phone]    Audelia Briggs        August 4, 2021     Patient: Ramonita Allen   YOB: 1990   Date of Visit: 8/4/2021       To Whom It May Concern: It is my medical opinion that Stanislaw Collazo should remain out of work until resolution of back strain, will be reevaluted in clinic one week and will be initiating physical therapy. If you have any questions or concerns, please don't hesitate to call.     Sincerely,        REGINA Briggs

## 2021-08-06 NOTE — RESULT ENCOUNTER NOTE
Gio:    Rosy Mrs. Zunigaee Pac; Your x-ray results came back; There is no acute bony abnormality or fracture; however there is mild intervertebral disc height loss at the bottom of your spine and top of your tailbone which may be contributory to your ongoing back issues; be sure to follow-up with physical therapy. Also, even a small amount of weight loss may provide some benefit as it will reduce the overall load placed on your lower back/spine etc.    Please let me know if you have any other questions or concerns.   Thanks,  Robert Manzo

## 2021-08-12 ENCOUNTER — OFFICE VISIT (OUTPATIENT)
Dept: FAMILY MEDICINE CLINIC | Age: 31
End: 2021-08-12
Payer: COMMERCIAL

## 2021-08-12 VITALS
WEIGHT: 278.2 LBS | TEMPERATURE: 98.2 F | HEART RATE: 98 BPM | OXYGEN SATURATION: 97 % | RESPIRATION RATE: 18 BRPM | DIASTOLIC BLOOD PRESSURE: 78 MMHG | SYSTOLIC BLOOD PRESSURE: 108 MMHG | BODY MASS INDEX: 47.75 KG/M2

## 2021-08-12 DIAGNOSIS — M54.50 ACUTE MIDLINE LOW BACK PAIN WITHOUT SCIATICA: Primary | ICD-10-CM

## 2021-08-12 DIAGNOSIS — M62.838 MUSCLE SPASM: ICD-10-CM

## 2021-08-12 PROCEDURE — 99213 OFFICE O/P EST LOW 20 MIN: CPT | Performed by: NURSE PRACTITIONER

## 2021-08-12 RX ORDER — VIT C/B6/B5/MAGNESIUM/HERB 173 50-5-6-5MG
CAPSULE ORAL
COMMUNITY
End: 2022-07-06

## 2021-08-12 RX ORDER — MAGNESIUM 30 MG
30 TABLET ORAL 2 TIMES DAILY
COMMUNITY

## 2021-08-12 RX ORDER — TIZANIDINE 4 MG/1
4 TABLET ORAL 3 TIMES DAILY PRN
Qty: 30 TABLET | Refills: 0 | Status: SHIPPED | OUTPATIENT
Start: 2021-08-12 | End: 2021-08-16 | Stop reason: SINTOL

## 2021-08-12 ASSESSMENT — PATIENT HEALTH QUESTIONNAIRE - PHQ9
SUM OF ALL RESPONSES TO PHQ9 QUESTIONS 1 & 2: 0
SUM OF ALL RESPONSES TO PHQ QUESTIONS 1-9: 0
1. LITTLE INTEREST OR PLEASURE IN DOING THINGS: 0
2. FEELING DOWN, DEPRESSED OR HOPELESS: 0

## 2021-08-12 ASSESSMENT — ENCOUNTER SYMPTOMS
CONSTIPATION: 0
SHORTNESS OF BREATH: 0
NAUSEA: 0
CHEST TIGHTNESS: 0
WHEEZING: 0
COUGH: 0
DIARRHEA: 0
BACK PAIN: 1
ABDOMINAL PAIN: 0
VOMITING: 0

## 2021-08-12 NOTE — LETTER
[facility] AT [facility]  [address] [address]  Phone: [phone]  Fax: [phone]    MEGHAN Dong - GISSELLE        August 12, 2021     Patient: Akhil Silver   YOB: 1990   Date of Visit: 8/12/2021       To Whom It May Concern: It is my medical opinion that Neisha Nieves may return to work on 8/16/21. If you have any questions or concerns, please don't hesitate to call.     Sincerely,          MEGHAN Dong - CNP

## 2021-08-12 NOTE — PROGRESS NOTES
SUBJECTIVE:    Meg Emmanuel  1990  31 y.o.  female      Chief Complaint   Patient presents with    Other     Pt here for a 1 week f/u    Other     States that since she has stopped taking the muscle relaxer \"everything is getting tight\" Muscle spasms have improved some     HPI     Complains of tightness in back over the last week. She was seen in urgent care and then evaluated in this office. She completed a course of prednisone and muscle relaxer. She had xray completed and starts PT tomorrow. She is working on weight loss. Overall, pain has improved since last week. She does still have some spasms in her lower back. No numbness or tingling. No bowel or bladder dysfunction. No saddle anesthesia. She does have FMLA completed. Her last day of work was 8/3/21. Allergies   Allergen Reactions    Amoxicillin Hives and Swelling       Current Outpatient Medications   Medication Sig Dispense Refill    magnesium 30 MG tablet Take 30 mg by mouth 2 times daily      Potassium 75 MG TABS Take by mouth      Turmeric 500 MG CAPS Take by mouth      tiZANidine (ZANAFLEX) 4 MG tablet Take 1 tablet by mouth 3 times daily as needed (spasm) 30 tablet 0    loratadine (CLARITIN) 10 MG tablet Take 10 mg by mouth daily      GRAHAM 0.25-35 MG-MCG per tablet TAKE 1 TABLET BY MOUTH EVERY DAY 84 tablet 1    fluticasone (VERAMYST) 27.5 MCG/SPRAY nasal spray 2 sprays by Each Nostril route daily      Multiple Vitamins-Minerals (MULTI FOR HER PO) Take  by mouth. No current facility-administered medications for this visit. Past Medical History:   Diagnosis Date    ADHD (attention deficit hyperactivity disorder)     Bronchitis     Chronic back pain     H/O Doppler ultrasound (Bilateral Carotids) 05/24/2018    Normal vertebral flow. No hemodynamically significant stenosis noted in the internal carotid artery bilaterally.  H/O echocardiogram 05/24/2018    Normal study. EF 55-60%.     H/O exercise stress test 05/17/2018    treadmill    Pertussis     Seasonal allergies     Syncope      Past Surgical History:   Procedure Laterality Date    WISDOM TOOTH EXTRACTION       Social History     Socioeconomic History    Marital status: Single     Spouse name: None    Number of children: None    Years of education: None    Highest education level: None   Occupational History    None   Tobacco Use    Smoking status: Never Smoker    Smokeless tobacco: Never Used   Vaping Use    Vaping Use: Never used   Substance and Sexual Activity    Alcohol use: Yes     Comment: Rare    Drug use: No    Sexual activity: Not Currently   Other Topics Concern    None   Social History Narrative    None     Social Determinants of Health     Financial Resource Strain:     Difficulty of Paying Living Expenses:    Food Insecurity:     Worried About Running Out of Food in the Last Year:     Ran Out of Food in the Last Year:    Transportation Needs:     Lack of Transportation (Medical):  Lack of Transportation (Non-Medical):    Physical Activity:     Days of Exercise per Week:     Minutes of Exercise per Session:    Stress:     Feeling of Stress :    Social Connections:     Frequency of Communication with Friends and Family:     Frequency of Social Gatherings with Friends and Family:     Attends Sikhism Services:     Active Member of Clubs or Organizations:     Attends Club or Organization Meetings:     Marital Status:    Intimate Partner Violence:     Fear of Current or Ex-Partner:     Emotionally Abused:     Physically Abused:     Sexually Abused:          No problem-specific Assessment & Plan notes found for this encounter. Review of Systems   Constitutional: Negative for appetite change, chills, fatigue and unexpected weight change. Respiratory: Negative for cough, chest tightness, shortness of breath and wheezing. Cardiovascular: Negative for chest pain, palpitations and leg swelling.    Gastrointestinal: Negative for abdominal pain, constipation, diarrhea, nausea and vomiting. Musculoskeletal: Positive for back pain. Negative for arthralgias. Neurological: Negative for weakness, numbness and headaches. Hematological: Negative for adenopathy. OBJECTIVE:    /78 (Site: Left Upper Arm, Position: Sitting, Cuff Size: Large Adult)   Pulse 98   Temp 98.2 °F (36.8 °C)   Resp 18   Wt 278 lb 3.2 oz (126.2 kg)   LMP 08/05/2021   SpO2 97%   BMI 47.75 kg/m²     Physical Exam  Constitutional:       Appearance: Normal appearance. She is well-developed. HENT:      Head: Normocephalic and atraumatic. Right Ear: Hearing normal.      Left Ear: Hearing normal.   Cardiovascular:      Rate and Rhythm: Normal rate and regular rhythm. Heart sounds: No murmur heard. No friction rub. No gallop. Pulmonary:      Effort: Pulmonary effort is normal.      Breath sounds: Normal breath sounds. No wheezing, rhonchi or rales. Abdominal:      Palpations: Abdomen is soft. Musculoskeletal:      Cervical back: Normal range of motion and neck supple. Lumbar back: Tenderness present. No swelling or bony tenderness. Decreased range of motion. Negative right straight leg raise test and negative left straight leg raise test.   Skin:     General: Skin is warm and dry. Neurological:      General: No focal deficit present. Mental Status: She is alert and oriented to person, place, and time. Psychiatric:         Mood and Affect: Mood normal.         Behavior: Behavior normal. Behavior is cooperative. Thought Content: Thought content normal.         ASSESSMENT/PLAN:    1. Acute midline low back pain without sciatica  Recommend NSAIDs to help with inflammation. Gentle stretching. Complete course of PT, work on weight loss. Follow up if not continuing to improve    2. Muscle spasm  zanaflex PRN for spasm. - tiZANidine (ZANAFLEX) 4 MG tablet;  Take 1 tablet by mouth 3 times daily as needed (spasm) Dispense: 30 tablet; Refill: 0               Return if symptoms worsen or fail to improve.       (Please note that portions of this note may have been completed with a voice recognition program. Efforts were made to edit the dictations but occasionally words aremis-transcribed.)

## 2021-08-13 ENCOUNTER — TELEPHONE (OUTPATIENT)
Dept: FAMILY MEDICINE CLINIC | Age: 31
End: 2021-08-13

## 2021-08-13 NOTE — TELEPHONE ENCOUNTER
Would like to  Prescriptions for Therapy. Therapy is being done at place of employment instead of where pt. Was referred.

## 2021-08-16 ENCOUNTER — TELEPHONE (OUTPATIENT)
Dept: FAMILY MEDICINE CLINIC | Age: 31
End: 2021-08-16

## 2021-08-16 RX ORDER — CYCLOBENZAPRINE HCL 5 MG
5 TABLET ORAL NIGHTLY PRN
Qty: 15 TABLET | Refills: 0 | Status: SHIPPED | OUTPATIENT
Start: 2021-08-16 | End: 2022-02-11

## 2021-08-16 NOTE — TELEPHONE ENCOUNTER
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

## 2021-08-17 ENCOUNTER — TELEPHONE (OUTPATIENT)
Dept: FAMILY MEDICINE CLINIC | Age: 31
End: 2021-08-17

## 2021-08-17 NOTE — TELEPHONE ENCOUNTER
New medication Zanaflex is too strong for pt. To even function. Pt would like to go back to Flexeril. Pt. Needs a prescription from the  To do Physical therapy at [facility].

## 2021-08-30 ENCOUNTER — TELEPHONE (OUTPATIENT)
Dept: FAMILY MEDICINE CLINIC | Age: 31
End: 2021-08-30

## 2021-08-30 DIAGNOSIS — M62.838 MUSCLE SPASM: Primary | ICD-10-CM

## 2021-08-30 RX ORDER — CYCLOBENZAPRINE HCL 5 MG
5 TABLET ORAL 2 TIMES DAILY PRN
Qty: 20 TABLET | Refills: 0 | Status: SHIPPED | OUTPATIENT
Start: 2021-08-30 | End: 2021-09-09

## 2021-09-13 ENCOUNTER — HOSPITAL ENCOUNTER (EMERGENCY)
Age: 31
Discharge: HOME | End: 2021-09-13
Attending: EMERGENCY MEDICINE
Payer: COMMERCIAL

## 2021-09-13 ENCOUNTER — TELEPHONE (OUTPATIENT)
Dept: FAMILY MEDICINE CLINIC | Age: 31
End: 2021-09-13

## 2021-09-13 VITALS
HEIGHT: 64 IN | DIASTOLIC BLOOD PRESSURE: 63 MMHG | HEART RATE: 74 BPM | RESPIRATION RATE: 16 BRPM | TEMPERATURE: 100 F | SYSTOLIC BLOOD PRESSURE: 109 MMHG | OXYGEN SATURATION: 96 % | WEIGHT: 275 LBS | BODY MASS INDEX: 46.95 KG/M2

## 2021-09-13 DIAGNOSIS — R11.2 NON-INTRACTABLE VOMITING WITH NAUSEA, UNSPECIFIED VOMITING TYPE: Primary | ICD-10-CM

## 2021-09-13 DIAGNOSIS — U07.1 COVID-19: ICD-10-CM

## 2021-09-13 PROCEDURE — 6370000000 HC RX 637 (ALT 250 FOR IP): Performed by: EMERGENCY MEDICINE

## 2021-09-13 PROCEDURE — 99284 EMERGENCY DEPT VISIT MOD MDM: CPT

## 2021-09-13 RX ORDER — IBUPROFEN 600 MG/1
600 TABLET ORAL ONCE
Status: COMPLETED | OUTPATIENT
Start: 2021-09-13 | End: 2021-09-13

## 2021-09-13 RX ORDER — GUAIFENESIN/DEXTROMETHORPHAN 100-10MG/5
5 SYRUP ORAL 3 TIMES DAILY PRN
Qty: 120 ML | Refills: 0 | Status: SHIPPED | OUTPATIENT
Start: 2021-09-13 | End: 2021-09-15 | Stop reason: ALTCHOICE

## 2021-09-13 RX ORDER — ONDANSETRON 4 MG/1
4 TABLET, ORALLY DISINTEGRATING ORAL EVERY 8 HOURS PRN
Qty: 15 TABLET | Refills: 0 | Status: SHIPPED | OUTPATIENT
Start: 2021-09-13

## 2021-09-13 RX ORDER — ONDANSETRON 4 MG/1
4 TABLET, ORALLY DISINTEGRATING ORAL ONCE
Status: COMPLETED | OUTPATIENT
Start: 2021-09-13 | End: 2021-09-13

## 2021-09-13 RX ADMIN — IBUPROFEN 600 MG: 600 TABLET ORAL at 12:26

## 2021-09-13 RX ADMIN — ONDANSETRON 4 MG: 4 TABLET, ORALLY DISINTEGRATING ORAL at 12:26

## 2021-09-13 ASSESSMENT — PAIN SCALES - GENERAL: PAINLEVEL_OUTOF10: 7

## 2021-09-13 NOTE — ED PROVIDER NOTES
magnesium 30 MG tablet Take 30 mg by mouth 2 times daily      Potassium 75 MG TABS Take by mouth      Turmeric 500 MG CAPS Take by mouth      loratadine (CLARITIN) 10 MG tablet Take 10 mg by mouth daily      GRAHAM 0.25-35 MG-MCG per tablet TAKE 1 TABLET BY MOUTH EVERY DAY 84 tablet 1    fluticasone (VERAMYST) 27.5 MCG/SPRAY nasal spray 2 sprays by Each Nostril route daily      Multiple Vitamins-Minerals (MULTI FOR HER PO) Take  by mouth. ALLERGIES    Allergies   Allergen Reactions    Amoxicillin Hives and Swelling       SOCIAL AND FAMILY HISTORY    Social History     Socioeconomic History    Marital status: Single     Spouse name: None    Number of children: None    Years of education: None    Highest education level: None   Occupational History    None   Tobacco Use    Smoking status: Never Smoker    Smokeless tobacco: Never Used   Vaping Use    Vaping Use: Never used   Substance and Sexual Activity    Alcohol use: Yes     Comment: Rare    Drug use: No    Sexual activity: Not Currently   Other Topics Concern    None   Social History Narrative    None     Social Determinants of Health     Financial Resource Strain:     Difficulty of Paying Living Expenses:    Food Insecurity:     Worried About Running Out of Food in the Last Year:     Ran Out of Food in the Last Year:    Transportation Needs:     Lack of Transportation (Medical):      Lack of Transportation (Non-Medical):    Physical Activity:     Days of Exercise per Week:     Minutes of Exercise per Session:    Stress:     Feeling of Stress :    Social Connections:     Frequency of Communication with Friends and Family:     Frequency of Social Gatherings with Friends and Family:     Attends Shinto Services:     Active Member of Clubs or Organizations:     Attends Club or Organization Meetings:     Marital Status:    Intimate Partner Violence:     Fear of Current or Ex-Partner:     Emotionally Abused:     Physically Abused:     Sexually Abused:      Family History   Problem Relation Age of Onset    Arthritis Mother     No Known Problems Father     Cancer Maternal Grandmother     Alzheimer's Disease Paternal Grandmother     Cancer Paternal Grandfather          PHYSICAL EXAM    VITAL SIGNS: /61   Pulse 96   Temp 101.4 °F (38.6 °C) (Oral)   Resp 21   Ht 5' 4\" (1.626 m)   Wt 275 lb (124.7 kg)   LMP 09/06/2021   SpO2 95%   BMI 47.20 kg/m²    Constitutional:  Well developed, No acute distress. HENT:  Normocephalic, Atraumatic, PERRL. EOMI. Sclera clear. Conjunctiva normal.  Neck: supple without ridigity  Cardiovascular:  Regular rate and rhythm, No murmurs  Respiratory:  Nonlabored breathing. Normal breath sounds  Abdomen: Soft, Non tender, bowel sounds present. Musculoskeletal: No edema, No tenderness  Integument:  Warm, Dry, no rash  Neurologic:  Alert & oriented , No focal deficits noted. Speech normal.  Psychiatric:  Affect normal, Mood normal.           ED COURSE & MEDICAL DECISION MAKING       Vital signs and nursing notes reviewed during ED course. All pertinent Lab data and radiographic results reviewed with patient at bedside. The patient and/or the family were informed of the results of any tests/labs/imaging, the treatment plan, and time was allotted to answer questions. 22-year-old female presenting with Covid symptoms. Was diagnosed last week. She was febrile here in the emergency department. She is given Motrin. Given Zofran for nausea vomiting. .  She is now resting comfortably. Oxygen saturations 95% on room air. She does not appear to be in respiratory distress. At this point we will treat symptomatically with antiemetics, fever control, antitussives. Instructed to continue symptomatic care at home and continue quarantine. We will plan for discharge with outpatient follow-up and return precautions.       Clinical  IMPRESSION    COVID, nausea, vomiting           (Please note the MDM and HPI sections of this note were completed with a voice recognition program.  Efforts were made to edit the dictations but occasionally words are mis-transcribed.)      Johnnie Stovall DO  09/13/21 6021

## 2021-09-13 NOTE — TELEPHONE ENCOUNTER
Dr. Andrew Simon came over and advised that he spoke to  Pt regarding son. States that pt tested positive for Covid and is very SOB, is coughing and very congested. He requested that I call and check on pt. Spoke with pt and pt is very SOB and was coughing on the phone. Pt having trouble speaking d/t symptoms. Pt could only get a few words out with out coughing or gasping for air. Advised pt to go to ED for eval/treatment. Pt verbalized understanding.

## 2021-09-14 ENCOUNTER — CARE COORDINATION (OUTPATIENT)
Dept: CARE COORDINATION | Age: 31
End: 2021-09-14

## 2021-09-14 NOTE — CARE COORDINATION
Patient contacted regarding COVID-19 diagnosis. Discussed COVID-19 related testing which was available at this time. Test results were positive. Patient informed of results, if available? Yes. Ambulatory Care Manager contacted the patient by telephone to perform post discharge assessment. Call within 2 business days of discharge: Yes. Verified name and  with patient as identifiers. Provided introduction to self, and explanation of the CTN/ACM role, and reason for call due to risk factors for infection and/or exposure to COVID-19. Symptoms reviewed with patient who verbalized the following symptoms: no worsening symptoms. Due to no new or worsening symptoms encounter was not routed to provider for escalation. Discussed follow-up appointments. If no appointment was previously scheduled, appointment scheduling offered: Yes. [facility] follow up appointment(s):   Future Appointments   Date Time Provider Charis Epperson   9/15/2021  4:00 PM Gopal Curran, [address] [facility]     Non-[facility] follow up appointment(s): NA    Non-face-to-face services provided:  Obtained and reviewed discharge summary and/or continuity of care documents     Advance Care Planning:   Does patient have an Advance Directive:  patient declined education. Educated patient about risk for severe COVID-19 due to risk factors according to CDC guidelines. ACM reviewed discharge instructions, medical action plan and red flag symptoms with the patient who verbalized understanding. Discussed COVID vaccination status: Yes. Education provided on COVID-19 vaccination as appropriate. Discussed exposure protocols and quarantine with CDC Guidelines. Patient was given an opportunity to verbalize any questions and concerns and agrees to contact ACM or health care provider for questions related to their healthcare.     Reviewed and educated patient on any new and changed medications related to discharge diagnosis     Was patient discharged with a pulse oximeter? No Discussed and confirmed pulse oximeter discharge instructions and when to notify provider or seek emergency care. ACM provided contact information. No further follow-up call identified based on severity of symptoms and risk factors.

## 2021-09-14 NOTE — TELEPHONE ENCOUNTER
Patient called and lm on MA vm-I returned patient's call-she c/o dizziness-she is (+) covid-instructions given regardin her medications that were given-could possibly cause dizziness-she verbalizes understanding-she is to return to work Thursday with -virtual amaris appt set up for 9-15-21 to extend time of worrk off with [employer] [place]

## 2021-09-14 NOTE — TELEPHONE ENCOUNTER
Noted. Thank you. Depending on when her symptoms started she is a potential candidate for antibody infusion, but needs to be within certain length of time of symptom onset.

## 2021-09-15 ENCOUNTER — HOSPITAL ENCOUNTER (EMERGENCY)
Age: 31
Discharge: HOME | End: 2021-09-15
Attending: EMERGENCY MEDICINE
Payer: COMMERCIAL

## 2021-09-15 ENCOUNTER — APPOINTMENT (OUTPATIENT)
Dept: GENERAL RADIOLOGY | Age: 31
End: 2021-09-15
Payer: COMMERCIAL

## 2021-09-15 ENCOUNTER — VIRTUAL VISIT (OUTPATIENT)
Dept: FAMILY MEDICINE CLINIC | Age: 31
End: 2021-09-15
Payer: COMMERCIAL

## 2021-09-15 VITALS
HEIGHT: 64 IN | TEMPERATURE: 102.4 F | OXYGEN SATURATION: 92 % | DIASTOLIC BLOOD PRESSURE: 93 MMHG | HEART RATE: 90 BPM | BODY MASS INDEX: 46.44 KG/M2 | RESPIRATION RATE: 24 BRPM | WEIGHT: 272 LBS | SYSTOLIC BLOOD PRESSURE: 131 MMHG

## 2021-09-15 DIAGNOSIS — R06.2 WHEEZING: ICD-10-CM

## 2021-09-15 DIAGNOSIS — R50.9 ACUTE FEBRILE ILLNESS: ICD-10-CM

## 2021-09-15 DIAGNOSIS — U07.1 COVID-19: Primary | ICD-10-CM

## 2021-09-15 DIAGNOSIS — R11.0 NAUSEA: ICD-10-CM

## 2021-09-15 DIAGNOSIS — U07.1 COVID-19 VIRUS INFECTION: Primary | ICD-10-CM

## 2021-09-15 PROCEDURE — 71045 X-RAY EXAM CHEST 1 VIEW: CPT

## 2021-09-15 PROCEDURE — 94640 AIRWAY INHALATION TREATMENT: CPT

## 2021-09-15 PROCEDURE — 6370000000 HC RX 637 (ALT 250 FOR IP): Performed by: EMERGENCY MEDICINE

## 2021-09-15 PROCEDURE — 99285 EMERGENCY DEPT VISIT HI MDM: CPT

## 2021-09-15 PROCEDURE — 99214 OFFICE O/P EST MOD 30 MIN: CPT | Performed by: NURSE PRACTITIONER

## 2021-09-15 RX ORDER — IBUPROFEN 800 MG/1
800 TABLET ORAL EVERY 8 HOURS PRN
Qty: 30 TABLET | Refills: 0 | Status: SHIPPED | OUTPATIENT
Start: 2021-09-15

## 2021-09-15 RX ORDER — DEXTROMETHORPHAN HYDROBROMIDE AND PROMETHAZINE HYDROCHLORIDE 15; 6.25 MG/5ML; MG/5ML
5 SYRUP ORAL ONCE
Status: COMPLETED | OUTPATIENT
Start: 2021-09-15 | End: 2021-09-15

## 2021-09-15 RX ORDER — BENZONATATE 100 MG/1
200 CAPSULE ORAL ONCE
Status: COMPLETED | OUTPATIENT
Start: 2021-09-15 | End: 2021-09-15

## 2021-09-15 RX ORDER — ACETAMINOPHEN 500 MG
1000 TABLET ORAL ONCE
Status: COMPLETED | OUTPATIENT
Start: 2021-09-15 | End: 2021-09-15

## 2021-09-15 RX ORDER — DEXTROMETHORPHAN HYDROBROMIDE AND PROMETHAZINE HYDROCHLORIDE 15; 6.25 MG/5ML; MG/5ML
5 SYRUP ORAL 4 TIMES DAILY PRN
Qty: 120 ML | Refills: 0 | Status: SHIPPED | OUTPATIENT
Start: 2021-09-15 | End: 2021-09-22

## 2021-09-15 RX ORDER — BENZONATATE 100 MG/1
200 CAPSULE ORAL 3 TIMES DAILY PRN
Qty: 21 CAPSULE | Refills: 0 | Status: SHIPPED | OUTPATIENT
Start: 2021-09-15 | End: 2021-09-22

## 2021-09-15 RX ORDER — ALBUTEROL SULFATE 90 UG/1
2 AEROSOL, METERED RESPIRATORY (INHALATION) ONCE
Status: COMPLETED | OUTPATIENT
Start: 2021-09-15 | End: 2021-09-15

## 2021-09-15 RX ORDER — ALBUTEROL SULFATE 90 UG/1
2 AEROSOL, METERED RESPIRATORY (INHALATION) 4 TIMES DAILY PRN
Qty: 18 G | Refills: 5 | Status: SHIPPED | OUTPATIENT
Start: 2021-09-15

## 2021-09-15 RX ORDER — ONDANSETRON 4 MG/1
4 TABLET, FILM COATED ORAL 3 TIMES DAILY PRN
Qty: 30 TABLET | Refills: 0 | Status: SHIPPED | OUTPATIENT
Start: 2021-09-15 | End: 2022-07-06

## 2021-09-15 RX ADMIN — ALBUTEROL SULFATE 2 PUFF: 108 INHALANT RESPIRATORY (INHALATION) at 14:15

## 2021-09-15 RX ADMIN — Medication 5 ML: at 14:04

## 2021-09-15 RX ADMIN — BENZONATATE 200 MG: 100 CAPSULE ORAL at 14:05

## 2021-09-15 RX ADMIN — ACETAMINOPHEN 1000 MG: 500 TABLET ORAL at 14:04

## 2021-09-15 ASSESSMENT — ENCOUNTER SYMPTOMS
CHEST TIGHTNESS: 0
RHINORRHEA: 1
VOMITING: 0
ABDOMINAL PAIN: 0
SHORTNESS OF BREATH: 1
CONSTIPATION: 0
WHEEZING: 1
COUGH: 1
DIARRHEA: 0
NAUSEA: 1

## 2021-09-15 ASSESSMENT — PAIN DESCRIPTION - LOCATION: LOCATION: CHEST;GENERALIZED

## 2021-09-15 ASSESSMENT — PAIN SCALES - GENERAL
PAINLEVEL_OUTOF10: 7
PAINLEVEL_OUTOF10: 7

## 2021-09-15 ASSESSMENT — PAIN DESCRIPTION - DESCRIPTORS: DESCRIPTORS: ACHING;SHARP

## 2021-09-15 ASSESSMENT — PAIN DESCRIPTION - PAIN TYPE: TYPE: ACUTE PAIN

## 2021-09-15 ASSESSMENT — PAIN DESCRIPTION - FREQUENCY: FREQUENCY: CONTINUOUS

## 2021-09-15 NOTE — ED NOTES
Discharge instructions reviewed with patient. Reviewed prescriptions with patient. No additional questions asked. Voiced understanding. Encouraged patient to follow up as discussed by the ED physician. Discussed the use of prescribed nausea medication. Encouraged patient to wait 20 to 30 minutes after taking medication before attempting to eat or drink. Recommended clear liquids only for the next 6 to 12 hours then slowly advance diet as tolerated. Patient voiced understanding. No additional questions asked.      Swapnil Lizama RN  09/15/21 [id number]

## 2021-09-15 NOTE — PROGRESS NOTES
9/15/2021    TELEHEALTH EVALUATION -- Audio/Visual (During ESAVV-37 public health emergency)    Due to COVID-19 related state of emergency restrictions , as an alternative to an in-person session, the clinical decision was made to utilize a virtual visit to provide services for this patient's visit. These services were provided via [employer]. me with the patient in [facility] ED while I was located at [place] [address] and [facility]. Identity was confirmed via patient name and . Verbal consent for use of telehealth was provided to and completed by the patient. HPI:    Vanessa Fofana (:  1990) has requested an audio/video evaluation for the following concern(s):    Chief Complaint   Patient presents with    Cough    Positive For Covid-19     Symptoms started on 21. She was tested on 21 and found to have positive covid results. Complains of cough, SOB, fever, weakness, lightheaded, myalgias, chest pain, wheezing, rhinorrhea, postnasal drainage. She has taken ibuprofen. She has taken robitussin and elderberry supplement. Symptoms gradually worsening. She is currently in the ED due to increasing SOB and fever of 103.4. she was given tessalon, phenergan DM and tylenol and had chest xray completed. Impression   Patchy peripheral infiltrates seen within the mid to lower lungs bilaterally,   mild in severity.  While nonspecific, given the patient's history and the   appearance of the infiltrates, findings are most suggestive of COVID-19   pneumonia. No problem-specific Assessment & Plan notes found for this encounter. Review of Systems   Constitutional: Positive for fatigue and fever. Negative for appetite change, chills and unexpected weight change. HENT: Positive for postnasal drip and rhinorrhea. Respiratory: Positive for cough, shortness of breath and wheezing. Negative for chest tightness. Cardiovascular: Negative for chest pain, palpitations and leg swelling. Gastrointestinal: Positive for nausea. Negative for abdominal pain, constipation, diarrhea and vomiting. Musculoskeletal: Negative for arthralgias. Neurological: Positive for light-headedness. Negative for weakness, numbness and headaches. Hematological: Negative for adenopathy. Prior to Visit Medications    Medication Sig Taking? Authorizing Provider   albuterol sulfate HFA (VENTOLIN HFA) 108 (90 Base) MCG/ACT inhaler Inhale 2 puffs into the lungs 4 times daily as needed for Wheezing Yes Rito Hew, APRN - CNP   ondansetron (ZOFRAN) 4 MG tablet Take 1 tablet by mouth 3 times daily as needed for Nausea or Vomiting Yes Rito Hew, APRN - CNP   promethazine-dextromethorphan (PROMETHAZINE-DM) 6.25-15 MG/5ML syrup Take 5 mLs by mouth 4 times daily as needed for Cough  Rex Loss, DO   benzonatate (TESSALON PERLES) 100 MG capsule Take 2 capsules by mouth 3 times daily as needed for Cough  Rex Loss, DO   ibuprofen (ADVIL;MOTRIN) 800 MG tablet Take 1 tablet by mouth every 8 hours as needed for Pain or Fever  Rex Loss, DO   ondansetron (ZOFRAN ODT) 4 MG disintegrating tablet Take 1 tablet by mouth every 8 hours as needed for Nausea  Jaimie Clinton, DO   magnesium 30 MG tablet Take 30 mg by mouth 2 times daily  Historical Provider, MD   Potassium 75 MG TABS Take by mouth  Historical Provider, MD   Turmeric 500 MG CAPS Take by mouth  Historical Provider, MD   loratadine (CLARITIN) 10 MG tablet Take 10 mg by mouth daily  Historical Provider, MD STEVENSON 0.25-35 MG-MCG per tablet TAKE 1 TABLET BY MOUTH EVERY DAY  Ashwin Steen Felton PA   fluticasone (VERAMYST) 27.5 MCG/SPRAY nasal spray 2 sprays by Each Nostril route daily  Historical Provider, MD   Multiple Vitamins-Minerals (MULTI FOR HER PO) Take  by mouth.   Historical Provider, MD       Allergies   Allergen Reactions    Amoxicillin Hives and Swelling       Social History     Tobacco Use    Smoking status: Never Smoker    Smokeless tobacco: Never Used   Vaping Use    Vaping Use: Never used   Substance Use Topics    Alcohol use: Yes     Comment: Rare    Drug use: No      Past Medical History:   Diagnosis Date    ADHD (attention deficit hyperactivity disorder)     Bronchitis     Chronic back pain     H/O Doppler ultrasound (Bilateral Carotids) 05/24/2018    Normal vertebral flow. No hemodynamically significant stenosis noted in the internal carotid artery bilaterally.  H/O echocardiogram 05/24/2018    Normal study. EF 55-60%.  H/O exercise stress test 05/17/2018    treadmill    Pertussis     Seasonal allergies     Syncope      Past Surgical History:   Procedure Laterality Date    WISDOM TOOTH EXTRACTION           PHYSICAL EXAMINATION:    Vital Signs: (As obtained by patient/caregiver or practitioner observation)    Blood pressure-  Heart rate-    Respiratory rate-    Temperature-  Pulse oximetry-     Physical Exam  Constitutional:       Appearance: Normal appearance. She is ill-appearing. HENT:      Head: Normocephalic and atraumatic. Right Ear: Hearing and external ear normal.      Left Ear: Hearing and external ear normal.   Eyes:      Extraocular Movements: Extraocular movements intact. Pulmonary:      Effort: Pulmonary effort is normal.      Comments: No visualized signs of difficulty breathing  Musculoskeletal:      Cervical back: Normal range of motion. Skin:     Comments: No significant exanthematous lesions or discoloration noted on facial skin   Neurological:      Mental Status: She is alert and oriented to person, place, and time. Cranial Nerves: No facial asymmetry. Psychiatric:         Mood and Affect: Mood and affect normal.         Speech: Speech normal.         Behavior: Behavior is cooperative. Thought Content:  Thought content normal.         Other pertinent observable physical exam findings-     Due to this being a TeleHealth encounter, evaluation of the following organ systems is limited: Vitals/Constitutional/EENT/Resp/CV/GI//MS/Neuro/Skin/Heme-Lymph-Imm. ASSESSMENT/PLAN:  1. COVID-19  Continue to isolate. Should continue to remain out of work. Take phenergan DM, tessalon PRN. Recommend tylenol or ibuprofen PRN for fever, myalgia. Will give albuterol PRN for wheezing. Follow up in one week    2. Wheezing  - albuterol sulfate HFA (VENTOLIN HFA) 108 (90 Base) MCG/ACT inhaler; Inhale 2 puffs into the lungs 4 times daily as needed for Wheezing  Dispense: 18 g; Refill: 5    3. Nausea  - ondansetron (ZOFRAN) 4 MG tablet; Take 1 tablet by mouth 3 times daily as needed for Nausea or Vomiting  Dispense: 30 tablet; Refill: 0      Return in about 1 week (around 9/22/2021). An  electronic signature was used to authenticate this note. --MEGHAN Gregorio - CNP on 9/15/2021 at 4:07 PM             Pursuant to the emergency declaration under the [facility], [facility] waiver authority and the [employer] Act, this Virtual  Visit was conducted, with patient's consent, to reduce the patient's risk of exposure to COVID-19 and provide necessary medical care. The patient (and/or legal guardian) has also been advised to contact this office for worsening conditions or problems, and seek emergency medical treatment and/or call 911 if deemed necessary. Services were provided through a video synchronous discussion virtually to substitute for in-person clinic visit.         (Please note that portions of this note may have been completed with a voice recognition program. Efforts were made to edit the dictations but occasionally words aremis-transcribed.)

## 2021-09-15 NOTE — ED PROVIDER NOTES
Emergency Department Encounter  Location: [facility] [address] [place] [address]    Patient: Akhil Silver  MRN: [id number]  : 1990  Date of evaluation: 9/15/2021  ED Provider: Amira Tomlin DO, FACEP    Chief Complaint:    Positive For Covid-19 (States sx began on Sep 5, + for OVID Sept 7, Cough, Congestion, SOB,&  Fever. States temp has been 103. Rechecked by EMS and 97.9. Took ibuprofen sometime today, but cannot remember when. ), Cough, Generalized Body Aches, Fever, and Shortness of Breath    Zuni:  Akhil Silver is a 31 y.o. female that presents to the emergency department by squad with complaints of a fever of 103.4. She states that her aunt told her that if her temperature was greater than 103 she needed to return to the hospital. Patient was here 2 days ago and was diagnosed with Covid. She has been coughing. She was prescribed guaifenesin DM and Zofran. She states she took ibuprofen sometime this morning but cannot remember when. She presents to the emergency department with cough chest discomfort from coughing and fever of 103. She does not have an oxygen requirement    ROS:  At least 4 systems reviewed and otherwise acutely negative except as in the [address] Positive for fever or chills  Positive for chest pain  Positive for shortness of breath  Positive for nausea vomiting diarrhea    Past Medical History:   Diagnosis Date    ADHD (attention deficit hyperactivity disorder)     Bronchitis     Chronic back pain     H/O Doppler ultrasound (Bilateral Carotids) 2018    Normal vertebral flow. No hemodynamically significant stenosis noted in the internal carotid artery bilaterally.  H/O echocardiogram 2018    Normal study. EF 55-60%.     H/O exercise stress test 2018    treadmill    Pertussis     Seasonal allergies     Syncope      Past Surgical History:   Procedure Laterality Date    WISDOM TOOTH EXTRACTION       Family History   Problem Relation Age of Onset    Arthritis by mouth 3 times daily as needed for Cough 21 capsule 0    ibuprofen (ADVIL;MOTRIN) 800 MG tablet Take 1 tablet by mouth every 8 hours as needed for Pain or Fever 30 tablet 0    ondansetron (ZOFRAN ODT) 4 MG disintegrating tablet Take 1 tablet by mouth every 8 hours as needed for Nausea 15 tablet 0    magnesium 30 MG tablet Take 30 mg by mouth 2 times daily      Potassium 75 MG TABS Take by mouth      Turmeric 500 MG CAPS Take by mouth      loratadine (CLARITIN) 10 MG tablet Take 10 mg by mouth daily      GRAHAM 0.25-35 MG-MCG per tablet TAKE 1 TABLET BY MOUTH EVERY DAY 84 tablet 1    fluticasone (VERAMYST) 27.5 MCG/SPRAY nasal spray 2 sprays by Each Nostril route daily      Multiple Vitamins-Minerals (MULTI FOR HER PO) Take  by mouth. Allergies   Allergen Reactions    Amoxicillin Hives and Swelling     Nursing Notes Reviewed    Physical Exam:  ED Triage Vitals [09/15/21 1337]   Enc Vitals Group      /60      Pulse 90      Resp 24      Temp 103.1 °F (39.5 °C)      Temp Source Oral      SpO2 93 %      Weight 272 lb (123.4 kg)      Height 5' 4\" (1.626 m)      Head Circumference       Peak Flow       Pain Score       Pain Loc       Pain Edu? Excl. in [address]? GENERAL APPEARANCE: Awake and alert. Cooperative. No acute distress. Nontoxic in appearance. She is coughing during the exam. She appears uncomfortable. HEAD: Normocephalic. Atraumatic. EYES: Sclera anicteric. ENT: Tolerates saliva. NECK: Supple. Trachea midline. LUNGS: Respirations unlabored. No wheezes rales or rhonchi. She is coughing during the exam. It is a dry cough  EXTREMITIES: No acute deformities. SKIN: Warm and dry. NEUROLOGICAL: No gross facial drooping. PSYCHIATRIC: Normal mood. Labs:  No results found for this visit on 09/15/21.     Radiographs (if obtained):  [] The following radiograph was interpreted by myself in the absence of a radiologist:  [x] Radiologist's Report reviewed at time of ED visit:  XR CHEST PORTABLE   Final Result   Patchy peripheral infiltrates seen within the mid to lower lungs bilaterally,   mild in severity. While nonspecific, given the patient's history and the   appearance of the infiltrates, findings are most suggestive of COVID-19   pneumonia. ED Course and MDM:  Here in the emergency department the patient has an albuterol inhaler treatments. She was given Tessalon and Phenergan DM. She is feeling somewhat better at this time. She was also given 1 g of Tylenol. Her temperature has come down to 102. She is feeling somewhat better. Her x-ray did show patchy peripheral infiltrates which is mild in severity. The patient does not have an oxygen requirement. She will be discharged home to continue medications and is instructed to return if her condition worsens. She is discharged stable condition at this time. Final Impression:  1. COVID-19 virus infection    2.  Acute febrile illness      DISPOSITION Decision To Discharge    Patient [facility]  [facility] [address]  [phone]    Schedule an appointment as soon as possible for a visit in 1 week  For follow up    Discharge medications:  New Prescriptions    BENZONATATE (TESSALON PERLES) 100 MG CAPSULE    Take 2 capsules by mouth 3 times daily as needed for Cough    IBUPROFEN (ADVIL;MOTRIN) 800 MG TABLET    Take 1 tablet by mouth every 8 hours as needed for Pain or Fever    PROMETHAZINE-DEXTROMETHORPHAN (PROMETHAZINE-DM) 6.25-15 MG/5ML SYRUP    Take 5 mLs by mouth 4 times daily as needed for Cough     (Please note that portions of this note may have been completed with a voice recognition program. Efforts were made to edit the dictations but occasionally words are mis-transcribed.)    Amira Tomlin DO, [facility]  Board certified in [address] [place] [address] Marco Tomlin, [address] [place] [address]  09/15/21 [address] [place] [address]

## 2021-09-15 NOTE — ED NOTES
Went in to discharge patient. Patient on Telehealth video call with her physician.       Dav Vargas RN  09/15/21 [id number]

## 2021-09-15 NOTE — ED TRIAGE NOTES
Arrived per [facility] to room 6 for triage. Tolerated without difficulty. Bed in lowest position. Call light given.  Gowned for exam.

## 2021-09-15 NOTE — LETTER
[facility] & [facility] [address] [place] [id number]  Phone: [phone]  Fax: [phone]    MEGHAN Fisher CNP        September 15, 2021     Patient: Ramonita Allen   YOB: 1990   Date of Visit: 9/15/2021       To Whom It May Concern: It is my medical opinion that Stanislaw Cough should remain out of work until follow up with PCP in one week. .    If you have any questions or concerns, please don't hesitate to call.     Sincerely,        MEGHAN Fisher CNP

## 2021-09-16 ENCOUNTER — CARE COORDINATION (OUTPATIENT)
Dept: CARE COORDINATION | Age: 31
End: 2021-09-16

## 2021-09-16 NOTE — CARE COORDINATION
[facility] ED Follow Up Call    2021    Patient: Daniel Colunga Patient : 1990   MRN: <[id number]>  Reason for Admission:   COVID+  Discharge Date:  9/15/21    Patient contacted regarding COVID-19 risk, exposure, diagnosis, pulse oximeter ordered at discharge and monoclonal antibody infusion follow up. Discussed COVID-19 related testing which was available at this time. Test results were positive. Patient informed of results, if available? Yes. Ambulatory Care Manager contacted the patient by telephone to perform post discharge assessment. Call within 2 business days of discharge: Yes. Verified name and  with patient as identifiers. Provided introduction to self, and explanation of the CTN/ACM role, and reason for call due to risk factors for infection and/or exposure to COVID-19. Symptoms reviewed with patient who verbalized the following symptoms: fatigue, cough, diarrhea and no new symptoms. Encouraged rest/ hydration. Fluids to thin mucous; OTC Advil, Motrin as directed for fever, body aches. Instructed on the BRAT diet; small meals as tolerated. Reviewed s/s to report to MD/911. Due to no new or worsening symptoms encounter was not routed to provider for escalation. Discussed follow-up appointments. If no appointment was previously scheduled, appointment scheduling offered: Yes. Patient reports that she completed VV yesterday. [facility] follow up appointment(s): No future appointments. Non-[facility] follow up appointment(s):    Non-face-to-face services provided:  Education of patient/family/caregiver/guardian to support self-management-1     Advance Care Planning:   Does patient have an Advance Directive:  not on file. Educated patient about risk for severe COVID-19 due to risk factors according to CDC guidelines. ACM reviewed discharge instructions, medical action plan and red flag symptoms with the patient who verbalized understanding.  Discussed COVID vaccination status: Yes. Education provided on COVID-19 vaccination as appropriate. Discussed exposure protocols and quarantine with CDC Guidelines. Patient was given an opportunity to verbalize any questions and concerns and agrees to contact ACM or health care provider for questions related to their healthcare. Reviewed and educated patient on any new and changed medications related to discharge diagnosis. Confirmed that patient has Tessalon, Advil and Promethazine and is taking them as directed. Was patient discharged with a pulse oximeter? No.  Discussed and confirmed pulse oximeter discharge instructions and when to notify provider or seek emergency care. Patient denies any questions or barriers to obtaining medication or supplies. Confirmed that patient has contact information for local HD should needs arise. ACM provided contact information. No further follow-up call identified based on severity of symptoms and risk factors.        Care Transitions ED Follow Up    Care Transitions Interventions

## 2021-09-23 ENCOUNTER — VIRTUAL VISIT (OUTPATIENT)
Dept: FAMILY MEDICINE CLINIC | Age: 31
End: 2021-09-23
Payer: COMMERCIAL

## 2021-09-23 DIAGNOSIS — U07.1 COVID-19: ICD-10-CM

## 2021-09-23 PROCEDURE — 99212 OFFICE O/P EST SF 10 MIN: CPT | Performed by: PHYSICIAN ASSISTANT

## 2021-09-23 NOTE — PROGRESS NOTES
2021    TELEHEALTH EVALUATION -- Audio/Visual (During ETVZB-32 public health emergency)    HPI:    Choco Singh (:  1990) has requested an audio/video evaluation for the following concern(s):    Visit today completed for positive covid 19. Pt seen in ER 9/15/2021. Tested positive at [facility] center (on campus). Overall improving but  contyinue to have lingering cough but has been afebrile for past 72 hours. Would like to be cleared to return to work at this time. Review of Systems   Constitutional: Negative for chills and fever. HENT: Negative for congestion. Respiratory: Positive for cough. Negative for chest tightness, shortness of breath and wheezing. Cardiovascular: Negative. Neurological: Negative for dizziness and headaches. Prior to Visit Medications    Medication Sig Taking?  Authorizing Provider   ibuprofen (ADVIL;MOTRIN) 800 MG tablet Take 1 tablet by mouth every 8 hours as needed for Pain or Fever  Ayah Newton DO   albuterol sulfate HFA (VENTOLIN HFA) 108 (90 Base) MCG/ACT inhaler Inhale 2 puffs into the lungs 4 times daily as needed for Wheezing  Isabell Number, APRN - CNP   ondansetron (ZOFRAN) 4 MG tablet Take 1 tablet by mouth 3 times daily as needed for Nausea or Vomiting  Isabell Number, APRN - CNP   ondansetron (ZOFRAN ODT) 4 MG disintegrating tablet Take 1 tablet by mouth every 8 hours as needed for Nausea  Conception Dings, DO   magnesium 30 MG tablet Take 30 mg by mouth 2 times daily  Historical Provider, MD   Potassium 75 MG TABS Take by mouth  Historical Provider, MD   Turmeric 500 MG CAPS Take by mouth  Historical Provider, MD   loratadine (CLARITIN) 10 MG tablet Take 10 mg by mouth daily  Historical Provider, MD STEVENSON 0.25-35 MG-MCG per tablet TAKE 1 TABLET BY MOUTH EVERY DAY  Yolanda Alcantar Collinston PA   fluticasone (VERAMYST) 27.5 MCG/SPRAY nasal spray 2 sprays by Each Nostril route daily  Historical Provider, MD   Multiple Vitamins-Minerals (MULTI FOR HER PO) Take  by mouth. Historical Provider, MD       Social History     Tobacco Use    Smoking status: Never Smoker    Smokeless tobacco: Never Used   Vaping Use    Vaping Use: Never used   Substance Use Topics    Alcohol use: Yes     Comment: Rare    Drug use: No        Allergies   Allergen Reactions    Amoxicillin Hives and Swelling   ,   Past Medical History:   Diagnosis Date    ADHD (attention deficit hyperactivity disorder)     Bronchitis     Chronic back pain     H/O Doppler ultrasound (Bilateral Carotids) 05/24/2018    Normal vertebral flow. No hemodynamically significant stenosis noted in the internal carotid artery bilaterally.  H/O echocardiogram 05/24/2018    Normal study. EF 55-60%.  H/O exercise stress test 05/17/2018    treadmill    Pertussis     Seasonal allergies     Syncope    ,   Past Surgical History:   Procedure Laterality Date    WISDOM TOOTH EXTRACTION     ,   Social History     Tobacco Use    Smoking status: Never Smoker    Smokeless tobacco: Never Used   Vaping Use    Vaping Use: Never used   Substance Use Topics    Alcohol use: Yes     Comment: Rare    Drug use: No       PHYSICAL EXAMINATION:  [ INSTRUCTIONS:  \"[x]\" Indicates a positive item  \"[]\" Indicates a negative item  -- DELETE ALL ITEMS NOT EXAMINED]  Vital Signs: (As obtained by patient/caregiver or practitioner observation)    Blood pressure-  Heart rate-    Respiratory rate-    Temperature-  Pulse oximetry-     Constitutional: [x] Appears well-developed and well-nourished [x] No apparent distress      [] Abnormal-   Mental status  [x] Alert and awake  [] Oriented to person/place/time []Able to follow commands      Eyes:  EOM    [x]  Normal  [] Abnormal-  Sclera  []  Normal  [] Abnormal -         Discharge []  None visible  [] Abnormal -    HENT:   [x] Normocephalic, atraumatic.   [] Abnormal   [] Mouth/Throat: Mucous membranes are moist.     External Ears [x] Normal  [] Abnormal-     Neck: [x] No visualized mass Pulmonary/Chest: [x] Respiratory effort normal.  [x] No visualized signs of difficulty breathing or respiratory distress        [] Abnormal-      Musculoskeletal:   [] Normal gait with no signs of ataxia         [x] Normal range of motion of neck        [] Abnormal-       Neurological:        [x] No Facial Asymmetry (Cranial nerve 7 motor function) (limited exam to video visit)          [] No gaze palsy        [] Abnormal-         Skin:        [x] No significant exanthematous lesions or discoloration noted on facial skin         [] Abnormal-            Psychiatric:       [x] Normal Affect [] No Hallucinations        [] Abnormal-     Other pertinent observable physical exam findings-     ASSESSMENT/PLAN:  1. COVID-19  Symptoms mostly resolved, afebrile x 72 hours, no diff breathing or sob. Will send letter for return to work. F/u if condition worsens or other concerns      Return if symptoms worsen or fail to improve. Juancarlos Aparna, was evaluated through a synchronous (real-time) audio-video encounter. The patient (or guardian if applicable) is aware that this is a billable service. Verbal consent to proceed has been obtained within the past 12 months. The visit was conducted pursuant to the emergency declaration under the [address] [place] [address] authority and the [employer] Act. Patient identification was verified, and a caregiver was present when appropriate. The patient was located in a state where the provider was credentialed to provide care. Total time spent on this encounter: Not billed by time    --Lakisha Ziegler PA-C on 9/27/2021 at 9:31 AM    An electronic signature was used to authenticate this note.

## 2021-09-24 ENCOUNTER — TELEPHONE (OUTPATIENT)
Dept: FAMILY MEDICINE CLINIC | Age: 31
End: 2021-09-24

## 2021-09-24 NOTE — TELEPHONE ENCOUNTER
Appears she saw Macario Benson for a virtual appointment yesterday. Will route to Macario Benson to see if she felt she was cleared to return to work.

## 2021-09-27 ENCOUNTER — TELEPHONE (OUTPATIENT)
Dept: FAMILY MEDICINE CLINIC | Age: 31
End: 2021-09-27

## 2021-09-27 PROBLEM — U07.1 COVID-19: Status: ACTIVE | Noted: 2021-09-27

## 2021-09-27 ASSESSMENT — ENCOUNTER SYMPTOMS
SHORTNESS OF BREATH: 0
CHEST TIGHTNESS: 0
COUGH: 1
WHEEZING: 0

## 2021-09-27 NOTE — TELEPHONE ENCOUNTER
Patient came into office today, requesting a note to give employer to justify extension of Covid leave. Please advise.

## 2021-09-27 NOTE — LETTER
[facility]  [address] [address]  Phone: [phone]  Fax: [phone]    MEGHAN Rivera CNP        September 27, 2021     Patient: Darrell Helms   YOB: 1990   Date of Visit: 9/27/2021       To Whom It May Concern:    [employer] medical leave was extended past the 10 days due to extended symptoms that were not improving at the time of exam on 9/15/21. If you have any questions or concerns, please don't hesitate to call.     Sincerely,        MEGHAN Rivera CNP

## 2022-01-05 ENCOUNTER — OFFICE VISIT (OUTPATIENT)
Dept: INTERNAL MEDICINE CLINIC | Age: 32
End: 2022-01-05
Payer: COMMERCIAL

## 2022-01-05 VITALS
BODY MASS INDEX: 44.39 KG/M2 | HEIGHT: 64 IN | HEART RATE: 98 BPM | TEMPERATURE: 97 F | OXYGEN SATURATION: 98 % | WEIGHT: 260 LBS

## 2022-01-05 DIAGNOSIS — R68.89 FLU-LIKE SYMPTOMS: Primary | ICD-10-CM

## 2022-01-05 DIAGNOSIS — Z30.09 BIRTH CONTROL COUNSELING: ICD-10-CM

## 2022-01-05 LAB
ORGANISM: ABNORMAL
REPORT: NORMAL
RESPIRATORY PANEL PCR: ABNORMAL

## 2022-01-05 PROCEDURE — 99213 OFFICE O/P EST LOW 20 MIN: CPT | Performed by: NURSE PRACTITIONER

## 2022-01-05 RX ORDER — DEXAMETHASONE 4 MG/1
4 TABLET ORAL 2 TIMES DAILY WITH MEALS
Qty: 20 TABLET | Refills: 0 | Status: SHIPPED | OUTPATIENT
Start: 2022-01-05 | End: 2022-01-15

## 2022-01-05 RX ORDER — NORGESTIMATE AND ETHINYL ESTRADIOL 0.25-0.035
KIT ORAL
Qty: 84 TABLET | Refills: 1 | Status: SHIPPED | OUTPATIENT
Start: 2022-01-05 | End: 2022-07-06

## 2022-01-05 RX ORDER — BENZONATATE 200 MG/1
200 CAPSULE ORAL 3 TIMES DAILY PRN
Qty: 30 CAPSULE | Refills: 0 | Status: SHIPPED | OUTPATIENT
Start: 2022-01-05 | End: 2022-01-12

## 2022-01-05 RX ORDER — DEXTROMETHORPHAN HYDROBROMIDE AND PROMETHAZINE HYDROCHLORIDE 15; 6.25 MG/5ML; MG/5ML
5 SYRUP ORAL 4 TIMES DAILY PRN
Qty: 140 ML | Refills: 0 | Status: SHIPPED | OUTPATIENT
Start: 2022-01-05 | End: 2022-01-12

## 2022-01-05 NOTE — PROGRESS NOTES
1/5/2022    HPI:  Chief complaint and history of present illness as per medical assistant/nurse documented today in the Flu/COVID-19 clinic. MEDICATIONS:  Prior to Visit Medications    Medication Sig Taking? Authorizing Provider   promethazine-dextromethorphan (PROMETHAZINE-DM) 6.25-15 MG/5ML syrup Take 5 mLs by mouth 4 times daily as needed for Cough Yes April T MEGHAN Akbar CNP   dexamethasone (DECADRON) 4 MG tablet Take 1 tablet by mouth 2 times daily (with meals) for 10 days Breakfast and lunch Yes April T MEGHAN Akbar CNP   benzonatate (TESSALON) 200 MG capsule Take 1 capsule by mouth 3 times daily as needed for Cough Yes April T MEGHAN Akbar CNP   oseltamivir (TAMIFLU) 75 MG capsule Take 1 capsule by mouth 2 times daily for 5 days  April T MEGHAN Akbar CNP   GRAHAM 0.25-35 MG-MCG per tablet TAKE 1 TABLET BY MOUTH EVERY DAY  Negar Patel PA-C   ibuprofen (ADVIL;MOTRIN) 800 MG tablet Take 1 tablet by mouth every 8 hours as needed for Pain or Fever  Geoffrey Wilcox DO   albuterol sulfate HFA (VENTOLIN HFA) 108 (90 Base) MCG/ACT inhaler Inhale 2 puffs into the lungs 4 times daily as needed for Wheezing  Skypper ManualMEGHAN CNP   ondansetron (ZOFRAN) 4 MG tablet Take 1 tablet by mouth 3 times daily as needed for Nausea or Vomiting  Skipper ManualMEGHAN CNP   ondansetron (ZOFRAN ODT) 4 MG disintegrating tablet Take 1 tablet by mouth every 8 hours as needed for Nausea  Janell Mcgee DO   magnesium 30 MG tablet Take 30 mg by mouth 2 times daily  Historical Provider, MD   Potassium 75 MG TABS Take by mouth  Historical Provider, MD   Turmeric 500 MG CAPS Take by mouth  Historical Provider, MD   loratadine (CLARITIN) 10 MG tablet Take 10 mg by mouth daily  Historical Provider, MD   fluticasone (VERAMYST) 27.5 MCG/SPRAY nasal spray 2 sprays by Each Nostril route daily  Historical Provider, MD   Multiple Vitamins-Minerals (MULTI FOR HER PO) Take  by mouth.   Historical Provider, MD       Allergies   Allergen Reactions    Amoxicillin Hives and Swelling   ,   Past Medical History:   Diagnosis Date    ADHD (attention deficit hyperactivity disorder)     Bronchitis     Chronic back pain     H/O Doppler ultrasound (Bilateral Carotids) 05/24/2018    Normal vertebral flow. No hemodynamically significant stenosis noted in the internal carotid artery bilaterally.  H/O echocardiogram 05/24/2018    Normal study. EF 55-60%.  H/O exercise stress test 05/17/2018    treadmill    Pertussis     Seasonal allergies     Syncope    ,   Past Surgical History:   Procedure Laterality Date    WISDOM TOOTH EXTRACTION         PHYSICAL EXAM:  Physical Exam  Vitals reviewed. Constitutional:       Appearance: She is ill-appearing. HENT:      Head: Normocephalic and atraumatic. Right Ear: Tympanic membrane, ear canal and external ear normal.      Left Ear: Tympanic membrane, ear canal and external ear normal.      Nose: Nose normal.      Mouth/Throat:      Mouth: Mucous membranes are moist.      Pharynx: Oropharyngeal exudate and posterior oropharyngeal erythema present. Eyes:      Pupils: Pupils are equal, round, and reactive to light. Pulmonary:      Effort: Pulmonary effort is normal.   Neurological:      General: No focal deficit present. Mental Status: She is alert and oriented to person, place, and time. Psychiatric:         Mood and Affect: Mood normal.         Behavior: Behavior normal.         Vitals:    01/05/22 1345   Pulse: 98   Temp: 97 °F (36.1 °C)   SpO2: 98%             ASSESSMENT/PLAN:  1. Flu-like symptoms  Pt is having SOB, muscle aches and pain. Pt is having a non productive cough. No signs of respiratory distress. Well hydrated.      - Respiratory Panel, Molecular, with COVID-19            I did don appropriate PPE (including N95 face mask, protective safety glasses, face shield, gloves, and gown) as recommended by the health facility/national standard best practice, during my interaction with the patient. FOLLOW-UP:  No follow-ups on file.     In addition to other information, the printed after visit summary provided to the patient includes:  [x] COVID-19 Self care instructions  [x] COVID-19 General patient information    April T MEGHAN Akbar - CNP

## 2022-01-05 NOTE — PROGRESS NOTES
1/5/22  Shayla Xiong  1990    FLU/COVID-19 CLINIC EVALUATION    HPI SYMPTOMS:    Employer:    [x] Fevers  [] Chills  [x] Cough  [] Coughing up blood  [x] Chest Congestion  [x] Nasal Congestion  [] Feeling short of breath  [] Sometimes  [] Frequently  [] All the time  [] Chest pain  [x] Headaches  []Tolerable  [] Severe  [x] Sore throat  [x] Muscle aches  [] Nausea  [] Vomiting  []Unable to keep fluids down  [] Diarrhea  []Severe    [x] OTHER SYMPTOMS:  fatigue  Symptom Duration:   [x] 1  [] 2   [] 3   [] 4    [] 5   [] 6   [] 7   [] 8   [] 9   [] 10   [] 11   [] 12   [] 13   [] 14   [] Longer than 14 days    Symptom course:   [x] Worsening     [] Stable     [] Improving    RISK FACTORS:    [] Pregnant or possibly pregnant  [] Age over 61  [] Diabetes  [] Heart disease  [] Asthma  [] COPD/Other chronic lung diseases  [] Active Cancer  [] On Chemotherapy  [] Taking oral steroids  [] History Lymphoma/Leukemia  [] Close contact with a lab confirmed COVID-19 patient within 14 days of symptom onset  [] History of travel from affected geographical areas within 14 days of symptom onset       VITALS:  Vitals:    01/05/22 1345   Pulse: 98   Temp: 97 °F (36.1 °C)   SpO2: 98%   Weight: 260 lb (117.9 kg)   Height: 5' 4\" (1.626 m)        An  electronic signature was used to authenticate this note.      --Ilia Carrillo MA on 1/5/2022 at 1:46 PM

## 2022-01-05 NOTE — PATIENT INSTRUCTIONS
[email or web address]    Your COVID 19 test can take 1-5 days for the results to come back. We ask that you make a Mychart page and view your test results this way. You will need to Self quarantine until you know your results. Increase fluids and rest  Saline nasal spray as needed for nasal congestion  Warm salt gargles as needed for throat discomfort  Monitor temperature twice a day  Tylenol as needed for fevers and/or discomfort. Big deep breaths periodically throughout the day  Regular Mucinex over the counter as needed for chest congestion  If symptoms worsen -Go to the ER. Follow up with your primary care provider      To Whom it May Concern:    Gala Allen was tested for COVID-19 1/5/2022. He/she must stay home until test results are back. If test is positive, he/she must quarantine for a total of 10 days starting from day one of symptom onset. He/she must also be fever-free for 24 hours at that time, and also have improvement in symptoms. CDC guidelines, however, are often changing and quarantine guidelines may be based on type of occupation. We do not recommend retesting as patients may continue to test positive for months even though no longer contagious. It is suggested you call [address] or [address] with any questions regarding quarantine timeframe/return to work/school details.

## 2022-01-06 DIAGNOSIS — J10.1 INFLUENZA A: Primary | ICD-10-CM

## 2022-01-06 RX ORDER — OSELTAMIVIR PHOSPHATE 75 MG/1
75 CAPSULE ORAL 2 TIMES DAILY
Qty: 10 CAPSULE | Refills: 0 | Status: SHIPPED | OUTPATIENT
Start: 2022-01-06 | End: 2022-01-11

## 2022-01-10 ENCOUNTER — TELEPHONE (OUTPATIENT)
Dept: INTERNAL MEDICINE CLINIC | Age: 32
End: 2022-01-10

## 2022-02-09 ENCOUNTER — OFFICE VISIT (OUTPATIENT)
Dept: INTERNAL MEDICINE CLINIC | Age: 32
End: 2022-02-09
Payer: COMMERCIAL

## 2022-02-09 DIAGNOSIS — K21.9 GASTROESOPHAGEAL REFLUX DISEASE, UNSPECIFIED WHETHER ESOPHAGITIS PRESENT: ICD-10-CM

## 2022-02-09 DIAGNOSIS — J02.9 SORE THROAT: Primary | ICD-10-CM

## 2022-02-09 PROCEDURE — 99213 OFFICE O/P EST LOW 20 MIN: CPT | Performed by: PHYSICIAN ASSISTANT

## 2022-02-09 RX ORDER — PANTOPRAZOLE SODIUM 20 MG/1
20 TABLET, DELAYED RELEASE ORAL
Qty: 30 TABLET | Refills: 0 | Status: SHIPPED | OUTPATIENT
Start: 2022-02-09 | End: 2022-07-06

## 2022-02-09 RX ORDER — PREDNISONE 20 MG/1
20 TABLET ORAL DAILY
Qty: 5 TABLET | Refills: 0 | Status: SHIPPED | OUTPATIENT
Start: 2022-02-09 | End: 2022-02-14

## 2022-02-09 RX ORDER — AZITHROMYCIN 250 MG/1
250 TABLET, FILM COATED ORAL SEE ADMIN INSTRUCTIONS
Qty: 6 TABLET | Refills: 0 | Status: SHIPPED | OUTPATIENT
Start: 2022-02-09 | End: 2022-02-14

## 2022-02-09 NOTE — PROGRESS NOTES
Shayla Xiong (:  1990) is a 36 y.o. female,Established patient, here for evaluation of the following chief complaint(s):    No chief complaint on file. SUBJECTIVE/OBJECTIVE:  HPI  Shayla Xiong is a pleasant 36 y.o. female presenting to clinic today for throat pain. Patient reports somewhat abrupt onset throat pain/neck pain yesterday with associated dry cough; patient reports that she has had similar episodes previously however not as significant; patient reports pain with swallowing, talking, deep breathing etc.; patient is exposed to particulate chemicals at work and does have somewhat uncontrolled acid reflux for which she takes as needed Tums and Zantac which provide temporary relief.   Patient denies other URI symptoms, shortness of breath etc.      Current Outpatient Medications   Medication Sig Dispense Refill    predniSONE (DELTASONE) 20 MG tablet Take 1 tablet by mouth daily for 5 days 5 tablet 0    pantoprazole (PROTONIX) 20 MG tablet Take 1 tablet by mouth every morning (before breakfast) 30 tablet 0    azithromycin (ZITHROMAX) 250 MG tablet Take 1 tablet by mouth See Admin Instructions for 5 days 500mg on day 1 followed by 250mg on days 2 - 5 6 tablet 0    GRAHAM 0.25-35 MG-MCG per tablet TAKE 1 TABLET BY MOUTH EVERY DAY 84 tablet 1    ibuprofen (ADVIL;MOTRIN) 800 MG tablet Take 1 tablet by mouth every 8 hours as needed for Pain or Fever 30 tablet 0    albuterol sulfate HFA (VENTOLIN HFA) 108 (90 Base) MCG/ACT inhaler Inhale 2 puffs into the lungs 4 times daily as needed for Wheezing 18 g 5    ondansetron (ZOFRAN) 4 MG tablet Take 1 tablet by mouth 3 times daily as needed for Nausea or Vomiting 30 tablet 0    ondansetron (ZOFRAN ODT) 4 MG disintegrating tablet Take 1 tablet by mouth every 8 hours as needed for Nausea 15 tablet 0    magnesium 30 MG tablet Take 30 mg by mouth 2 times daily      Potassium 75 MG TABS Take by mouth      Turmeric 500 MG CAPS Take by mouth      loratadine (CLARITIN) 10 MG tablet Take 10 mg by mouth daily      fluticasone (VERAMYST) 27.5 MCG/SPRAY nasal spray 2 sprays by Each Nostril route daily      Multiple Vitamins-Minerals (MULTI FOR HER PO) Take  by mouth. No current facility-administered medications for this visit. Review of Systems   Constitutional: Negative for appetite change, chills, fatigue and fever. HENT: Positive for sore throat. Negative for congestion, ear pain, hearing loss and rhinorrhea. Trouble swallowing:  pain with swallowing. Eyes: Negative for photophobia, pain, discharge and redness. Respiratory: Positive for cough. Negative for chest tightness, shortness of breath and wheezing. Cardiovascular: Negative for chest pain, palpitations and leg swelling. Gastrointestinal: Negative for abdominal pain, blood in stool, constipation, diarrhea, nausea and vomiting. Endocrine: Negative for polyuria. Genitourinary: Negative for difficulty urinating, dysuria, flank pain, frequency, hematuria and urgency. Musculoskeletal: Negative for arthralgias, back pain, gait problem and joint swelling. Skin: Negative for color change and rash. Neurological: Negative for dizziness, syncope, weakness, light-headedness and headaches. Hematological: Negative for adenopathy. Psychiatric/Behavioral: Negative for agitation, behavioral problems and suicidal ideas. The patient is not nervous/anxious. Physical Exam  HENT:      Head: Normocephalic and atraumatic. Right Ear: External ear normal.      Left Ear: External ear normal.      Mouth/Throat:      Pharynx: Posterior oropharyngeal erythema present. Comments: Hoarse voice; pain with swallowing. Patient is in no acute distress; lung sounds are clear  Cardiovascular:      Rate and Rhythm: Regular rhythm. Pulses: Normal pulses. Pulmonary:      Effort: No respiratory distress. Musculoskeletal:         General: Normal range of motion.    Skin:

## 2022-02-10 ASSESSMENT — ENCOUNTER SYMPTOMS
BACK PAIN: 0
NAUSEA: 0
EYE DISCHARGE: 0
RHINORRHEA: 0
BLOOD IN STOOL: 0
COUGH: 1
SORE THROAT: 1
WHEEZING: 0
ABDOMINAL PAIN: 0
CHEST TIGHTNESS: 0
COLOR CHANGE: 0
VOMITING: 0
PHOTOPHOBIA: 0
SHORTNESS OF BREATH: 0
CONSTIPATION: 0
EYE PAIN: 0
EYE REDNESS: 0
DIARRHEA: 0

## 2022-02-11 RX ORDER — CYCLOBENZAPRINE HCL 5 MG
5 TABLET ORAL NIGHTLY PRN
Qty: 15 TABLET | Refills: 0 | Status: SHIPPED | OUTPATIENT
Start: 2022-02-11 | End: 2022-08-05

## 2022-03-23 ENCOUNTER — OFFICE VISIT (OUTPATIENT)
Dept: FAMILY MEDICINE CLINIC | Age: 32
End: 2022-03-23
Payer: COMMERCIAL

## 2022-03-23 VITALS
OXYGEN SATURATION: 98 % | BODY MASS INDEX: 46 KG/M2 | DIASTOLIC BLOOD PRESSURE: 60 MMHG | TEMPERATURE: 96.4 F | WEIGHT: 268 LBS | SYSTOLIC BLOOD PRESSURE: 100 MMHG | HEART RATE: 89 BPM | RESPIRATION RATE: 18 BRPM

## 2022-03-23 DIAGNOSIS — N89.8 VAGINAL DISCHARGE: ICD-10-CM

## 2022-03-23 DIAGNOSIS — R30.0 DYSURIA: Primary | ICD-10-CM

## 2022-03-23 LAB
BILIRUBIN, POC: NORMAL
BLOOD URINE, POC: NORMAL
CLARITY, POC: CLEAR
COLOR, POC: YELLOW
GLUCOSE URINE, POC: NORMAL
KETONES, POC: NORMAL
LEUKOCYTE EST, POC: NORMAL
NITRITE, POC: NORMAL
PH, POC: 7
PROTEIN, POC: NORMAL
SPECIFIC GRAVITY, POC: 1.02
UROBILINOGEN, POC: 0.2

## 2022-03-23 PROCEDURE — 99213 OFFICE O/P EST LOW 20 MIN: CPT | Performed by: NURSE PRACTITIONER

## 2022-03-23 PROCEDURE — 81002 URINALYSIS NONAUTO W/O SCOPE: CPT | Performed by: NURSE PRACTITIONER

## 2022-03-23 ASSESSMENT — PATIENT HEALTH QUESTIONNAIRE - PHQ9
SUM OF ALL RESPONSES TO PHQ QUESTIONS 1-9: 0
2. FEELING DOWN, DEPRESSED OR HOPELESS: 0
1. LITTLE INTEREST OR PLEASURE IN DOING THINGS: 0
SUM OF ALL RESPONSES TO PHQ QUESTIONS 1-9: 0
SUM OF ALL RESPONSES TO PHQ9 QUESTIONS 1 & 2: 0

## 2022-03-23 ASSESSMENT — ENCOUNTER SYMPTOMS
CHEST TIGHTNESS: 0
CONSTIPATION: 0
WHEEZING: 0
COUGH: 0
DIARRHEA: 0
SHORTNESS OF BREATH: 0
VOMITING: 0
ABDOMINAL PAIN: 0
NAUSEA: 0

## 2022-03-23 NOTE — PROGRESS NOTES
SUBJECTIVE:    Shayla Xiong  1990  36 y.o.  female      Chief Complaint   Patient presents with    Urinary Tract Infection     vaginal odor-started about 2 wks ago-when she emptys her bladder she has burning      HPI     Symptom onset two weeks ago. Complains of vaginal odor, change in discharge. Complains of dysuria, urinary frequency. Denies increased urinary urgency. No rash, redness. No flank pain, fevers. She is sexually active. She has not taken anything for her symptoms. Symptoms have remained constant. Allergies   Allergen Reactions    Amoxicillin Hives and Swelling       Current Outpatient Medications   Medication Sig Dispense Refill    pantoprazole (PROTONIX) 20 MG tablet Take 1 tablet by mouth every morning (before breakfast) 30 tablet 0    GRAHAM 0.25-35 MG-MCG per tablet TAKE 1 TABLET BY MOUTH EVERY DAY 84 tablet 1    ibuprofen (ADVIL;MOTRIN) 800 MG tablet Take 1 tablet by mouth every 8 hours as needed for Pain or Fever 30 tablet 0    albuterol sulfate HFA (VENTOLIN HFA) 108 (90 Base) MCG/ACT inhaler Inhale 2 puffs into the lungs 4 times daily as needed for Wheezing 18 g 5    ondansetron (ZOFRAN) 4 MG tablet Take 1 tablet by mouth 3 times daily as needed for Nausea or Vomiting 30 tablet 0    ondansetron (ZOFRAN ODT) 4 MG disintegrating tablet Take 1 tablet by mouth every 8 hours as needed for Nausea 15 tablet 0    magnesium 30 MG tablet Take 30 mg by mouth 2 times daily      Potassium 75 MG TABS Take by mouth      Turmeric 500 MG CAPS Take by mouth      loratadine (CLARITIN) 10 MG tablet Take 10 mg by mouth daily      fluticasone (VERAMYST) 27.5 MCG/SPRAY nasal spray 2 sprays by Each Nostril route daily      Multiple Vitamins-Minerals (MULTI FOR HER PO) Take  by mouth. No current facility-administered medications for this visit.           Past Medical History:   Diagnosis Date    ADHD (attention deficit hyperactivity disorder)     Bronchitis     Chronic back pain     H/O Doppler ultrasound (Bilateral Carotids) 05/24/2018    Normal vertebral flow. No hemodynamically significant stenosis noted in the internal carotid artery bilaterally.  H/O echocardiogram 05/24/2018    Normal study. EF 55-60%.  H/O exercise stress test 05/17/2018    treadmill    Pertussis     Seasonal allergies     Syncope      Past Surgical History:   Procedure Laterality Date    WISDOM TOOTH EXTRACTION       Social History     Socioeconomic History    Marital status: Single     Spouse name: None    Number of children: None    Years of education: None    Highest education level: None   Occupational History    None   Tobacco Use    Smoking status: Never Smoker    Smokeless tobacco: Never Used   Vaping Use    Vaping Use: Never used   Substance and Sexual Activity    Alcohol use: Yes     Comment: Rare    Drug use: No    Sexual activity: Not Currently   Other Topics Concern    None   Social History Narrative    None     Social Determinants of Health     Financial Resource Strain:     Difficulty of Paying Living Expenses: Not on file   Food Insecurity:     Worried About Running Out of Food in the Last Year: Not on file    Jorge of Food in the Last Year: Not on file   Transportation Needs:     Lack of Transportation (Medical): Not on file    Lack of Transportation (Non-Medical):  Not on file   Physical Activity:     Days of Exercise per Week: Not on file    Minutes of Exercise per Session: Not on file   Stress:     Feeling of Stress : Not on file   Social Connections:     Frequency of Communication with Friends and Family: Not on file    Frequency of Social Gatherings with Friends and Family: Not on file    Attends Buddhism Services: Not on file    Active Member of Clubs or Organizations: Not on file    Attends Club or Organization Meetings: Not on file    Marital Status: Not on file   Intimate Partner Violence:     Fear of Current or Ex-Partner: Not on file   [employer] Abused: Not on file    Physically Abused: Not on file    Sexually Abused: Not on file   Housing Stability:     Unable to Pay for Housing in the Last Year: Not on file    Number of Places Lived in the Last Year: Not on file    Unstable Housing in the Last Year: Not on file         No problem-specific Assessment & Plan notes found for this encounter. Review of Systems   Constitutional: Negative for appetite change, chills, fatigue and unexpected weight change. Respiratory: Negative for cough, chest tightness, shortness of breath and wheezing. Cardiovascular: Negative for chest pain, palpitations and leg swelling. Gastrointestinal: Negative for abdominal pain, constipation, diarrhea, nausea and vomiting. Genitourinary: Positive for dysuria, frequency and vaginal discharge. Negative for flank pain, hematuria and urgency. Musculoskeletal: Negative for arthralgias. Neurological: Negative for weakness, numbness and headaches. Hematological: Negative for adenopathy. OBJECTIVE:    /60 (Site: Left Upper Arm, Position: Sitting, Cuff Size: Large Adult)   Pulse 89   Temp 96.4 °F (35.8 °C)   Resp 18   Wt 268 lb (121.6 kg)   LMP 03/09/2022 (Approximate)   SpO2 98%   BMI 46.00 kg/m²     Physical Exam  Constitutional:       Appearance: Normal appearance. HENT:      Head: Normocephalic and atraumatic. Right Ear: Hearing normal.      Left Ear: Hearing normal.   Cardiovascular:      Rate and Rhythm: Normal rate and regular rhythm. Heart sounds: Normal heart sounds. No murmur heard. No friction rub. No gallop. Pulmonary:      Effort: Pulmonary effort is normal. No respiratory distress. Breath sounds: Normal breath sounds. No stridor. No wheezing or rhonchi. Abdominal:      General: Abdomen is flat. Bowel sounds are normal. There is no distension. Palpations: Abdomen is soft. Tenderness: There is no abdominal tenderness.  There is no right CVA tenderness, left CVA tenderness, guarding or rebound. Musculoskeletal:         General: Normal range of motion. Cervical back: Normal range of motion and neck supple. Skin:     General: Skin is warm and dry. Neurological:      General: No focal deficit present. Mental Status: She is alert and oriented to person, place, and time. Psychiatric:         Mood and Affect: Mood normal.         Behavior: Behavior normal.         Thought Content: Thought content normal.         ASSESSMENT/PLAN:    1. Dysuria  UA  Negative--will send for culture. - POCT Urinalysis no Micro  - Culture, Urine    2. Vaginal discharge  Patient educated on swab collection. Will notify with results. Follow up in office if not improving.  - VAGINAL PATHOGENS PROBE *A  - C.trachomatis N.gonorrhoeae DNA, Urine               Return if symptoms worsen or fail to improve.       (Please note that portions of this note may have been completed with a voice recognition program. Efforts were made to edit the dictations but occasionally words aremis-transcribed.)

## 2022-03-24 LAB
CANDIDA SPECIES, DNA PROBE: NORMAL
GARDNERELLA VAGINALIS, DNA PROBE: NORMAL
TRICHOMONAS VAGINALIS DNA: NORMAL
URINE CULTURE, ROUTINE: NORMAL

## 2022-03-29 LAB
C. TRACHOMATIS DNA ,URINE: NEGATIVE
N. GONORRHOEAE DNA, URINE: NEGATIVE

## 2022-04-18 RX ORDER — CYCLOBENZAPRINE HCL 5 MG
5 TABLET ORAL NIGHTLY PRN
Qty: 15 TABLET | Refills: 0 | OUTPATIENT
Start: 2022-04-18 | End: 2022-04-28

## 2022-07-06 ENCOUNTER — OFFICE VISIT (OUTPATIENT)
Dept: FAMILY MEDICINE CLINIC | Age: 32
End: 2022-07-06
Payer: COMMERCIAL

## 2022-07-06 VITALS
BODY MASS INDEX: 46.62 KG/M2 | DIASTOLIC BLOOD PRESSURE: 84 MMHG | SYSTOLIC BLOOD PRESSURE: 128 MMHG | OXYGEN SATURATION: 98 % | RESPIRATION RATE: 16 BRPM | WEIGHT: 271.6 LBS | TEMPERATURE: 98.2 F | HEART RATE: 68 BPM

## 2022-07-06 DIAGNOSIS — R10.30 LOWER ABDOMINAL PAIN: ICD-10-CM

## 2022-07-06 DIAGNOSIS — N64.4 BREAST PAIN, LEFT: ICD-10-CM

## 2022-07-06 DIAGNOSIS — N64.52 NIPPLE DISCHARGE: Primary | ICD-10-CM

## 2022-07-06 LAB
CONTROL: NORMAL
PREGNANCY TEST URINE, POC: NEGATIVE

## 2022-07-06 PROCEDURE — 81025 URINE PREGNANCY TEST: CPT | Performed by: NURSE PRACTITIONER

## 2022-07-06 PROCEDURE — 99213 OFFICE O/P EST LOW 20 MIN: CPT | Performed by: NURSE PRACTITIONER

## 2022-07-06 ASSESSMENT — ENCOUNTER SYMPTOMS
VOMITING: 0
NAUSEA: 0
COUGH: 0
WHEEZING: 0
CONSTIPATION: 0
SHORTNESS OF BREATH: 0
DIARRHEA: 0
ABDOMINAL PAIN: 1
CHEST TIGHTNESS: 0

## 2022-07-06 NOTE — PROGRESS NOTES
SUBJECTIVE:    Rogers Gabriel  1990  36 y.o.  female      Chief Complaint   Patient presents with    Nipple Problem     C/o bilateral green nipple discharge x 1 week. Reports that she is only having L nipple discharge    Menstrual Problem     Reports that she has been on her menses x 2 weeks and L abd cramping. Stopped birth control about 1-2 months ago. HPI     Stopped oral contraceptive when she had covid. She is wanting to know if she can restart. Current period has been light, but has lasted two weeks. She has had left abdominal cramping. She has had regular bowel movements without constipation or diarrhea. Cramping is intermittent--improves with rest.     Bilateral breast discharge started two weeks ago. Green in color. Occasional discomfort to left lateral breast. No fevers. Sexually active with males. Negative home pregnancy test. No known mass. Allergies   Allergen Reactions    Amoxicillin Hives and Swelling       Current Outpatient Medications   Medication Sig Dispense Refill    ibuprofen (ADVIL;MOTRIN) 800 MG tablet Take 1 tablet by mouth every 8 hours as needed for Pain or Fever 30 tablet 0    albuterol sulfate HFA (VENTOLIN HFA) 108 (90 Base) MCG/ACT inhaler Inhale 2 puffs into the lungs 4 times daily as needed for Wheezing 18 g 5    ondansetron (ZOFRAN ODT) 4 MG disintegrating tablet Take 1 tablet by mouth every 8 hours as needed for Nausea 15 tablet 0    magnesium 30 MG tablet Take 30 mg by mouth 2 times daily      Potassium 75 MG TABS Take by mouth      loratadine (CLARITIN) 10 MG tablet Take 10 mg by mouth daily      Multiple Vitamins-Minerals (MULTI FOR HER PO) Take  by mouth. No current facility-administered medications for this visit. Past Medical History:   Diagnosis Date    ADHD (attention deficit hyperactivity disorder)     Bronchitis     Chronic back pain     H/O Doppler ultrasound (Bilateral Carotids) 05/24/2018    Normal vertebral flow.   No hemodynamically significant stenosis noted in the internal carotid artery bilaterally.  H/O echocardiogram 05/24/2018    Normal study. EF 55-60%.  H/O exercise stress test 05/17/2018    treadmill    Pertussis     Seasonal allergies     Syncope      Past Surgical History:   Procedure Laterality Date    WISDOM TOOTH EXTRACTION       Social History     Socioeconomic History    Marital status: Single     Spouse name: None    Number of children: None    Years of education: None    Highest education level: None   Occupational History    None   Tobacco Use    Smoking status: Never Smoker    Smokeless tobacco: Never Used   Vaping Use    Vaping Use: Never used   Substance and Sexual Activity    Alcohol use: Yes     Comment: Rare    Drug use: No    Sexual activity: Not Currently   Other Topics Concern    None   Social History Narrative    None     Social Determinants of Health     Financial Resource Strain:     Difficulty of Paying Living Expenses: Not on file   Food Insecurity:     Worried About Running Out of Food in the Last Year: Not on file    Jorge of Food in the Last Year: Not on file   Transportation Needs:     Lack of Transportation (Medical): Not on file    Lack of Transportation (Non-Medical):  Not on file   Physical Activity:     Days of Exercise per Week: Not on file    Minutes of Exercise per Session: Not on file   Stress:     Feeling of Stress : Not on file   Social Connections:     Frequency of Communication with Friends and Family: Not on file    Frequency of Social Gatherings with Friends and Family: Not on file    Attends Mosque Services: Not on file    Active Member of Clubs or Organizations: Not on file    Attends Club or Organization Meetings: Not on file    Marital Status: Not on file   Intimate Partner Violence:     Fear of Current or Ex-Partner: Not on file    Emotionally Abused: Not on file    Physically Abused: Not on file    Sexually Abused: Not on file Housing Stability:     Unable to Pay for Housing in the Last Year: Not on file    Number of Places Lived in the Last Year: Not on file    Unstable Housing in the Last Year: Not on file         No problem-specific Assessment & Plan notes found for this encounter. Review of Systems   Constitutional: Negative for appetite change, chills, fatigue, fever and unexpected weight change. Respiratory: Negative for cough, chest tightness, shortness of breath and wheezing. Cardiovascular: Negative for chest pain, palpitations and leg swelling. Gastrointestinal: Positive for abdominal pain (lower). Negative for constipation, diarrhea, nausea and vomiting. Musculoskeletal: Negative for arthralgias. Neurological: Negative for weakness, numbness and headaches. Hematological: Negative for adenopathy. OBJECTIVE:    /84 (Site: Left Upper Arm, Position: Sitting, Cuff Size: Large Adult)   Pulse 68   Temp 98.2 °F (36.8 °C)   Resp 16   Wt 271 lb 9.6 oz (123.2 kg)   LMP 06/22/2022   SpO2 98%   BMI 46.62 kg/m²     Physical Exam  Constitutional:       Appearance: Normal appearance. She is well-developed. HENT:      Head: Normocephalic and atraumatic. Right Ear: Hearing normal.      Left Ear: Hearing normal.   Cardiovascular:      Rate and Rhythm: Normal rate and regular rhythm. Heart sounds: Normal heart sounds. No murmur heard. No friction rub. No gallop. Pulmonary:      Effort: Pulmonary effort is normal.      Breath sounds: Normal breath sounds. No wheezing, rhonchi or rales. Chest:   Breasts:      Right: No swelling, mass, nipple discharge or tenderness. Left: Nipple discharge (minimal) and tenderness present. No swelling or mass. Abdominal:      General: Bowel sounds are normal. There is no distension. Palpations: Abdomen is soft. There is no mass. Tenderness: There is abdominal tenderness. There is no guarding. Hernia: No hernia is present. Musculoskeletal:         General: Normal range of motion. Cervical back: Normal range of motion and neck supple. Skin:     General: Skin is warm and dry. Neurological:      General: No focal deficit present. Mental Status: She is alert and oriented to person, place, and time. Psychiatric:         Mood and Affect: Mood normal.         Behavior: Behavior normal. Behavior is cooperative. Thought Content: Thought content normal.         ASSESSMENT/PLAN:    1. Nipple discharge  Labs as ordered. Ultrasounds as ordered. Not able to express enough for culture. Negative urine pregnancy. - Prolactin; Future  - TSH with Reflex; Future  - CBC with Auto Differential; Future  - Comprehensive Metabolic Panel; Future  - POCT urine pregnancy  - US BREAST RIGHT COMPLETE; Future  - US BREAST LEFT COMPLETE; Future    2. Lower abdominal pain  Negative urine pregnancy. Ultrasound as ordered. ED for severe worsening symptoms  - US PELVIS COMPLETE; Future    3. Breast pain, left  - US BREAST LEFT COMPLETE; Future               Return if symptoms worsen or fail to improve.       (Please note that portions of this note may have been completed with a voice recognition program. Efforts were made to edit the dictations but occasionally words aremis-transcribed.)

## 2022-07-11 RX ORDER — CYCLOBENZAPRINE HCL 5 MG
5 TABLET ORAL NIGHTLY PRN
Qty: 15 TABLET | Refills: 0 | OUTPATIENT
Start: 2022-07-11 | End: 2022-07-21

## 2022-08-01 ENCOUNTER — TELEPHONE (OUTPATIENT)
Dept: FAMILY MEDICINE CLINIC | Age: 32
End: 2022-08-01

## 2022-08-01 NOTE — TELEPHONE ENCOUNTER
She does not need to go to the ED for these issues. She needs to complete the breast ultrasound as ordered. She needs to see GYN if her period has lasted since her appointment on 7/6.

## 2022-08-01 NOTE — TELEPHONE ENCOUNTER
Patient returned call-instructed her not to go to ER-she should get in contact w/GYN-she scheduled the test to be done-she verbalizes understandin\g

## 2022-08-01 NOTE — TELEPHONE ENCOUNTER
Patient called still having large amount of vaginal bleeding and cramps-also continues with the breast discharge-instructed patient that she has not done the testing that was ordered and she said it's hard with work-I instructed her that it's important for those test to see what is going on-she is having cramps-wants to go to the ER I told her she could go to the ER and they can get the test done that was ordered or she can call a gyn-gave patient number to dr. Bisi Zavala understanding

## 2022-08-05 ENCOUNTER — HOSPITAL ENCOUNTER (OUTPATIENT)
Age: 32
Discharge: HOME | End: 2022-08-05
Payer: COMMERCIAL

## 2022-08-05 ENCOUNTER — HOSPITAL ENCOUNTER (OUTPATIENT)
Dept: ULTRASOUND IMAGING | Age: 32
Discharge: HOME | End: 2022-08-05
Payer: COMMERCIAL

## 2022-08-05 DIAGNOSIS — R10.30 LOWER ABDOMINAL PAIN: ICD-10-CM

## 2022-08-05 LAB
ALBUMIN SERPL-MCNC: 4.2 GM/DL (ref 3.4–5)
ALP BLD-CCNC: 77 IU/L (ref 40–129)
ALT SERPL-CCNC: 27 U/L (ref 10–40)
ANION GAP SERPL CALCULATED.3IONS-SCNC: 8 MMOL/L (ref 4–16)
AST SERPL-CCNC: 21 IU/L (ref 15–37)
BASOPHILS ABSOLUTE: 0 K/CU MM
BASOPHILS RELATIVE PERCENT: 0.3 % (ref 0–1)
BILIRUB SERPL-MCNC: 0.5 MG/DL (ref 0–1)
BUN BLDV-MCNC: 14 MG/DL (ref 6–23)
CALCIUM SERPL-MCNC: 9 MG/DL (ref 8.3–10.6)
CHLORIDE BLD-SCNC: 100 MMOL/L (ref 99–110)
CO2: 25 MMOL/L (ref 21–32)
CREAT SERPL-MCNC: 0.8 MG/DL (ref 0.6–1.1)
DIFFERENTIAL TYPE: ABNORMAL
EOSINOPHILS ABSOLUTE: 0.3 K/CU MM
EOSINOPHILS RELATIVE PERCENT: 4.4 % (ref 0–3)
GFR AFRICAN AMERICAN: >60 ML/MIN/1.73M2
GFR NON-AFRICAN AMERICAN: >60 ML/MIN/1.73M2
GLUCOSE FASTING: 78 MG/DL (ref 70–99)
HCT VFR BLD CALC: 39.1 % (ref 37–47)
HEMOGLOBIN: 13.1 GM/DL (ref 12.5–16)
IMMATURE NEUTROPHIL %: 0.1 % (ref 0–0.43)
LYMPHOCYTES ABSOLUTE: 2.4 K/CU MM
LYMPHOCYTES RELATIVE PERCENT: 35.7 % (ref 24–44)
MCH RBC QN AUTO: 29.4 PG (ref 27–31)
MCHC RBC AUTO-ENTMCNC: 33.5 % (ref 32–36)
MCV RBC AUTO: 87.9 FL (ref 78–100)
MONOCYTES ABSOLUTE: 0.4 K/CU MM
MONOCYTES RELATIVE PERCENT: 6.5 % (ref 0–4)
PDW BLD-RTO: 12.8 % (ref 11.7–14.9)
PLATELET # BLD: 247 K/CU MM (ref 140–440)
PMV BLD AUTO: 10.1 FL (ref 7.5–11.1)
POTASSIUM SERPL-SCNC: 4.1 MMOL/L (ref 3.5–5.1)
PROLACTIN: 17.5 NG/ML
RBC # BLD: 4.45 M/CU MM (ref 4.2–5.4)
SEGMENTED NEUTROPHILS ABSOLUTE COUNT: 3.6 K/CU MM
SEGMENTED NEUTROPHILS RELATIVE PERCENT: 53 % (ref 36–66)
SODIUM BLD-SCNC: 133 MMOL/L (ref 135–145)
T4 FREE: 0.94 NG/DL (ref 0.9–1.8)
TOTAL IMMATURE NEUTOROPHIL: 0.01 K/CU MM
TOTAL PROTEIN: 6.6 GM/DL (ref 6.4–8.2)
TSH HIGH SENSITIVITY: 1.67 UIU/ML (ref 0.27–4.2)
WBC # BLD: 6.8 K/CU MM (ref 4–10.5)

## 2022-08-05 PROCEDURE — 80053 COMPREHEN METABOLIC PANEL: CPT

## 2022-08-05 PROCEDURE — 76830 TRANSVAGINAL US NON-OB: CPT

## 2022-08-05 PROCEDURE — 84146 ASSAY OF PROLACTIN: CPT

## 2022-08-05 PROCEDURE — 76857 US EXAM PELVIC LIMITED: CPT

## 2022-08-05 PROCEDURE — 84439 ASSAY OF FREE THYROXINE: CPT

## 2022-08-05 PROCEDURE — 84443 ASSAY THYROID STIM HORMONE: CPT

## 2022-08-05 PROCEDURE — 36415 COLL VENOUS BLD VENIPUNCTURE: CPT

## 2022-08-05 PROCEDURE — 93975 VASCULAR STUDY: CPT

## 2022-08-05 PROCEDURE — 85025 COMPLETE CBC W/AUTO DIFF WBC: CPT

## 2022-08-05 RX ORDER — CYCLOBENZAPRINE HCL 5 MG
5 TABLET ORAL NIGHTLY PRN
Qty: 15 TABLET | Refills: 0 | Status: SHIPPED | OUTPATIENT
Start: 2022-08-05 | End: 2022-08-15

## 2022-08-11 ENCOUNTER — HOSPITAL ENCOUNTER (OUTPATIENT)
Dept: ULTRASOUND IMAGING | Age: 32
Discharge: HOME | End: 2022-08-11
Payer: COMMERCIAL

## 2022-08-11 DIAGNOSIS — N64.52 NIPPLE DISCHARGE: ICD-10-CM

## 2022-08-11 DIAGNOSIS — N64.4 BREAST PAIN, LEFT: ICD-10-CM

## 2022-08-11 PROCEDURE — 76642 ULTRASOUND BREAST LIMITED: CPT

## 2022-08-15 DIAGNOSIS — R92.8 ABNORMAL ULTRASOUND OF BREAST: Primary | ICD-10-CM

## 2022-08-16 ENCOUNTER — TELEPHONE (OUTPATIENT)
Dept: FAMILY MEDICINE CLINIC | Age: 32
End: 2022-08-16

## 2022-08-16 NOTE — TELEPHONE ENCOUNTER
----- Message from Carol De Paz sent at 8/16/2022 11:28 AM EDT -----  Subject: Message to Provider    QUESTIONS  Information for Provider? Pt returning call in regards to US results, pt   disconnected while holding for the practice. Please contact pt.  ---------------------------------------------------------------------------  --------------  Ria Mejia INFO  [phone];  Do not leave any message, patient will call back for answer  ---------------------------------------------------------------------------  --------------  SCRIPT ANSWERS  undefined

## 2022-11-02 RX ORDER — CYCLOBENZAPRINE HCL 5 MG
5 TABLET ORAL NIGHTLY PRN
Qty: 15 TABLET | Refills: 0 | OUTPATIENT
Start: 2022-11-02 | End: 2022-11-12

## 2022-12-05 ENCOUNTER — OFFICE VISIT (OUTPATIENT)
Dept: INTERNAL MEDICINE CLINIC | Age: 32
End: 2022-12-05
Payer: COMMERCIAL

## 2022-12-05 VITALS
HEIGHT: 64 IN | OXYGEN SATURATION: 99 % | HEART RATE: 76 BPM | WEIGHT: 271 LBS | BODY MASS INDEX: 46.26 KG/M2 | DIASTOLIC BLOOD PRESSURE: 78 MMHG | SYSTOLIC BLOOD PRESSURE: 128 MMHG

## 2022-12-05 DIAGNOSIS — M54.50 ACUTE RIGHT-SIDED LOW BACK PAIN WITHOUT SCIATICA: Primary | ICD-10-CM

## 2022-12-05 PROCEDURE — 99213 OFFICE O/P EST LOW 20 MIN: CPT | Performed by: PHYSICIAN ASSISTANT

## 2022-12-05 RX ORDER — CYCLOBENZAPRINE HCL 5 MG
5 TABLET ORAL NIGHTLY PRN
Qty: 30 TABLET | Refills: 0 | Status: SHIPPED | OUTPATIENT
Start: 2022-12-05 | End: 2022-12-15

## 2022-12-05 RX ORDER — METHYLPREDNISOLONE 4 MG/1
TABLET ORAL
Qty: 1 KIT | Refills: 0 | Status: SHIPPED | OUTPATIENT
Start: 2022-12-05 | End: 2022-12-11

## 2022-12-05 ASSESSMENT — ENCOUNTER SYMPTOMS
BLOOD IN STOOL: 0
BACK PAIN: 1
CHEST TIGHTNESS: 0
COLOR CHANGE: 0
CONSTIPATION: 0
ABDOMINAL PAIN: 0
COUGH: 0
EYE DISCHARGE: 0
NAUSEA: 0
PHOTOPHOBIA: 0
SHORTNESS OF BREATH: 0
DIARRHEA: 0
EYE PAIN: 0
WHEEZING: 0
VOMITING: 0
RHINORRHEA: 0
EYE REDNESS: 0
SORE THROAT: 0

## 2022-12-05 NOTE — LETTER
[facility]  [address]  Phone: [phone]  Fax: [phone]    Rasheed Boyer [place]        December 5, 2022     Patient: Leeanna Murphy   YOB: 1990   Date of Visit: 12/5/2022       To Whom It May Concern: It is my medical opinion that Maurice Malhotra was seen in clinic today and can return to work on Friday 12/9/2022 If symptoms are improved. If you have any questions or concerns, please don't hesitate to call.     Sincerely,        Rasheed Boyer PA-C

## 2022-12-05 NOTE — PROGRESS NOTES
Cee Allison (:  1990) is a 29 y.o. female,Established patient, here for evaluation of the following chief complaint(s):    Back Pain      SUBJECTIVE/OBJECTIVE:  HPI  Cee Allison is a pleasant 29 y.o. female presenting to clinic today for right-sided low back pain. Patient reports she was moving a sofa yesterday and pulled her back and right hip; patient reports pain and symptoms persist into today; patient reports she has tried hot showers, ibuprofen, Flexeril and at home stretching with minimal improvement or relief. Patient denies paresthesias in lower extremities, fever, incontinence, saddle anesthesias etc.  Patient reports history of similar flareups. Allergies   Allergen Reactions    Amoxicillin Hives and Swelling       Current Outpatient Medications   Medication Sig Dispense Refill    cyclobenzaprine (FLEXERIL) 5 MG tablet Take 1 tablet by mouth nightly as needed for Muscle spasms 30 tablet 0    methylPREDNISolone (MEDROL DOSEPACK) 4 MG tablet Take by mouth. 1 kit 0    ibuprofen (ADVIL;MOTRIN) 800 MG tablet Take 1 tablet by mouth every 8 hours as needed for Pain or Fever 30 tablet 0    albuterol sulfate HFA (VENTOLIN HFA) 108 (90 Base) MCG/ACT inhaler Inhale 2 puffs into the lungs 4 times daily as needed for Wheezing 18 g 5    ondansetron (ZOFRAN ODT) 4 MG disintegrating tablet Take 1 tablet by mouth every 8 hours as needed for Nausea 15 tablet 0    magnesium 30 MG tablet Take 30 mg by mouth 2 times daily      Potassium 75 MG TABS Take by mouth      loratadine (CLARITIN) 10 MG tablet Take 10 mg by mouth daily      Multiple Vitamins-Minerals (MULTI FOR HER PO) Take  by mouth. No current facility-administered medications for this visit. /78   Pulse 76   Ht 5' 4\" (1.626 m)   Wt 271 lb (122.9 kg)   SpO2 99%   BMI 46.52 kg/m²     Review of Systems   Constitutional:  Negative for appetite change, chills, fatigue and fever.    HENT:  Negative for congestion, ear pain, hearing loss, rhinorrhea and sore throat. Eyes:  Negative for photophobia, pain, discharge and redness. Respiratory:  Negative for cough, chest tightness, shortness of breath and wheezing. Cardiovascular:  Negative for chest pain, palpitations and leg swelling. Gastrointestinal:  Negative for abdominal pain, blood in stool, constipation, diarrhea, nausea and vomiting. Endocrine: Negative for polyuria. Genitourinary:  Negative for difficulty urinating, dysuria, flank pain, frequency, hematuria and urgency. Musculoskeletal:  Positive for arthralgias, back pain and myalgias. Negative for gait problem and joint swelling. Skin:  Negative for color change and rash. Neurological:  Negative for dizziness, syncope, weakness, light-headedness and headaches. Hematological:  Negative for adenopathy. Psychiatric/Behavioral:  Negative for agitation, behavioral problems and suicidal ideas. The patient is not nervous/anxious. Physical Exam  Constitutional:       General: She is not in acute distress. Appearance: She is obese. She is not ill-appearing, toxic-appearing or diaphoretic. HENT:      Head: Normocephalic and atraumatic. Right Ear: External ear normal.      Left Ear: External ear normal.   Cardiovascular:      Rate and Rhythm: Regular rhythm. Pulses: Normal pulses. Pulmonary:      Effort: Pulmonary effort is normal. No respiratory distress. Breath sounds: Normal breath sounds. Musculoskeletal:         General: Tenderness present. Normal range of motion. Comments: R sided low back TTP; R hip TTP; negative straight leg raise test. Normal distal sensation, cap refill, movement   Skin:     General: Skin is warm and dry. Neurological:      General: No focal deficit present. Mental Status: She is alert and oriented to person, place, and time. Mental status is at baseline. Psychiatric:         Behavior: Behavior normal.       ASSESSMENT/PLAN:  1.  Acute right-sided low back pain without sciatica   -Likely lumbago; no red flag symptoms at this time; trial steroid for anti-inflammatory, muscle relaxer nightly as needed to help with rest; stretches provided; avoid bedrest; work note provided; return to clinic if symptoms persist, change, worsen. -     cyclobenzaprine (FLEXERIL) 5 MG tablet; Take 1 tablet by mouth nightly as needed for Muscle spasms, Disp-30 tablet, R-0Normal  -     methylPREDNISolone (MEDROL DOSEPACK) 4 MG tablet; Take by mouth., Disp-1 kit, R-0Normal    Return in 1 week (on 12/12/2022), or if symptoms worsen or fail to improve, for Follow Up. An electronic signature was used to authenticate this note.     --Julius Pal, PA

## 2023-02-04 DIAGNOSIS — M54.50 ACUTE RIGHT-SIDED LOW BACK PAIN WITHOUT SCIATICA: ICD-10-CM

## 2023-02-06 RX ORDER — CYCLOBENZAPRINE HCL 5 MG
5 TABLET ORAL NIGHTLY PRN
Qty: 30 TABLET | Refills: 0 | OUTPATIENT
Start: 2023-02-06 | End: 2023-02-16

## 2023-02-23 DIAGNOSIS — N63.10 MASS OF RIGHT BREAST, UNSPECIFIED QUADRANT: Primary | ICD-10-CM

## 2023-04-20 ENCOUNTER — HOSPITAL ENCOUNTER (OUTPATIENT)
Dept: ULTRASOUND IMAGING | Age: 33
Discharge: HOME | End: 2023-04-20
Payer: COMMERCIAL

## 2023-04-20 DIAGNOSIS — N63.10 MASS OF RIGHT BREAST, UNSPECIFIED QUADRANT: ICD-10-CM

## 2023-04-20 PROCEDURE — 76642 ULTRASOUND BREAST LIMITED: CPT

## 2023-05-08 ENCOUNTER — OFFICE VISIT (OUTPATIENT)
Dept: INTERNAL MEDICINE CLINIC | Age: 33
End: 2023-05-08
Payer: COMMERCIAL

## 2023-05-08 VITALS
HEIGHT: 64 IN | BODY MASS INDEX: 46.26 KG/M2 | TEMPERATURE: 97.7 F | WEIGHT: 271 LBS | OXYGEN SATURATION: 98 % | HEART RATE: 84 BPM

## 2023-05-08 DIAGNOSIS — R11.2 NAUSEA VOMITING AND DIARRHEA: Primary | ICD-10-CM

## 2023-05-08 DIAGNOSIS — R19.7 NAUSEA VOMITING AND DIARRHEA: Primary | ICD-10-CM

## 2023-05-08 PROCEDURE — 99213 OFFICE O/P EST LOW 20 MIN: CPT | Performed by: PHYSICIAN ASSISTANT

## 2023-05-08 RX ORDER — ONDANSETRON 4 MG/1
4 TABLET, ORALLY DISINTEGRATING ORAL EVERY 8 HOURS PRN
Qty: 30 TABLET | Refills: 0 | Status: SHIPPED | OUTPATIENT
Start: 2023-05-08

## 2023-05-08 NOTE — PROGRESS NOTES
Esperanza Lemon (:  1990) is a 29 y.o. female,Established patient, here for evaluation of the following chief complaint(s):    Nausea & Vomiting (Started at 3 this morning last vomit was 2 hours ago. ), Headache (Started last night first symptom. ), and Abdominal Pain (Started last night. Second symptom. Left upper and lower q.)      SUBJECTIVE/OBJECTIVE:  HPI  Esperanza Lemon is a pleasant 29 y.o. female presenting to clinic today for GI symptoms. Patient reports experiencing headache last night with later onset abdominal discomfort and difficulty sleeping; reports she started having vomiting and diarrhea at around 4 AM; reports diarrhea has been watery brown stool, denies any dark tarry stools, denies any blood in stool; reports emesis consisted of recently eaten food and denies any melena or blood in vomit etc.  Patient reports family member was sick with similar symptoms last week. Patient reports another episode of vomiting about 2 hours ago, denies any subsequent diarrhea. Reports slight chills, denies fever. Reports abdominal pains have improved. Allergies   Allergen Reactions    Amoxicillin Hives and Swelling       Current Outpatient Medications   Medication Sig Dispense Refill    ondansetron (ZOFRAN ODT) 4 MG disintegrating tablet Take 1 tablet by mouth every 8 hours as needed for Nausea 30 tablet 0    ibuprofen (ADVIL;MOTRIN) 800 MG tablet Take 1 tablet by mouth every 8 hours as needed for Pain or Fever 30 tablet 0    albuterol sulfate HFA (VENTOLIN HFA) 108 (90 Base) MCG/ACT inhaler Inhale 2 puffs into the lungs 4 times daily as needed for Wheezing 18 g 5    magnesium 30 MG tablet Take 1 tablet by mouth 2 times daily      Potassium 75 MG TABS Take by mouth      loratadine (CLARITIN) 10 MG tablet Take 1 tablet by mouth daily      Multiple Vitamins-Minerals (MULTI FOR HER PO) Take  by mouth. No current facility-administered medications for this visit.        Pulse 84   Temp 97.7 °F (36.5

## 2023-05-10 ASSESSMENT — ENCOUNTER SYMPTOMS
EYE DISCHARGE: 0
DIARRHEA: 1
SHORTNESS OF BREATH: 0
RHINORRHEA: 0
NAUSEA: 1
VOMITING: 1
SORE THROAT: 0
ABDOMINAL PAIN: 1
PHOTOPHOBIA: 0
CHEST TIGHTNESS: 0
COLOR CHANGE: 0
EYE REDNESS: 0
BACK PAIN: 0
WHEEZING: 0
COUGH: 0
EYE PAIN: 0
CONSTIPATION: 0
BLOOD IN STOOL: 0

## 2023-06-24 ENCOUNTER — HOSPITAL ENCOUNTER (EMERGENCY)
Age: 33
Discharge: HOME | End: 2023-06-24
Attending: EMERGENCY MEDICINE
Payer: COMMERCIAL

## 2023-06-24 ENCOUNTER — APPOINTMENT (OUTPATIENT)
Dept: GENERAL RADIOLOGY | Age: 33
End: 2023-06-24
Payer: COMMERCIAL

## 2023-06-24 VITALS
WEIGHT: 260 LBS | RESPIRATION RATE: 16 BRPM | OXYGEN SATURATION: 99 % | TEMPERATURE: 98.2 F | BODY MASS INDEX: 44.39 KG/M2 | HEIGHT: 64 IN | SYSTOLIC BLOOD PRESSURE: 119 MMHG | HEART RATE: 73 BPM | DIASTOLIC BLOOD PRESSURE: 80 MMHG

## 2023-06-24 DIAGNOSIS — M79.604 RIGHT LEG PAIN: Primary | ICD-10-CM

## 2023-06-24 LAB
ANION GAP SERPL CALCULATED.3IONS-SCNC: 9 MMOL/L (ref 4–16)
BASOPHILS ABSOLUTE: 0 K/CU MM
BASOPHILS RELATIVE PERCENT: 0.5 % (ref 0–1)
BUN SERPL-MCNC: 12 MG/DL (ref 6–23)
CALCIUM SERPL-MCNC: 8.9 MG/DL (ref 8.3–10.6)
CHLORIDE BLD-SCNC: 102 MMOL/L (ref 99–110)
CO2: 26 MMOL/L (ref 21–32)
CREAT SERPL-MCNC: 0.8 MG/DL (ref 0.6–1.1)
D DIMER: <0.47 UG/ML (FEU)
DIFFERENTIAL TYPE: ABNORMAL
EOSINOPHILS ABSOLUTE: 0.3 K/CU MM
EOSINOPHILS RELATIVE PERCENT: 4 % (ref 0–3)
ERYTHROCYTE SEDIMENTATION RATE: 10 MM/HR (ref 0–20)
GFR SERPL CREATININE-BSD FRML MDRD: >60 ML/MIN/1.73M2
GLUCOSE SERPL-MCNC: 109 MG/DL (ref 70–99)
HCT VFR BLD CALC: 39.8 % (ref 37–47)
HEMOGLOBIN: 12.6 GM/DL (ref 12.5–16)
IMMATURE NEUTROPHIL %: 0.4 % (ref 0–0.43)
LYMPHOCYTES ABSOLUTE: 2.6 K/CU MM
LYMPHOCYTES RELATIVE PERCENT: 34.6 % (ref 24–44)
MCH RBC QN AUTO: 27.5 PG (ref 27–31)
MCHC RBC AUTO-ENTMCNC: 31.7 % (ref 32–36)
MCV RBC AUTO: 86.9 FL (ref 78–100)
MONOCYTES ABSOLUTE: 0.4 K/CU MM
MONOCYTES RELATIVE PERCENT: 5.7 % (ref 0–4)
PDW BLD-RTO: 13.1 % (ref 11.7–14.9)
PLATELET # BLD: 266 K/CU MM (ref 140–440)
PMV BLD AUTO: 10.2 FL (ref 7.5–11.1)
POTASSIUM SERPL-SCNC: 3.7 MMOL/L (ref 3.5–5.1)
RBC # BLD: 4.58 M/CU MM (ref 4.2–5.4)
SEGMENTED NEUTROPHILS ABSOLUTE COUNT: 4.2 K/CU MM
SEGMENTED NEUTROPHILS RELATIVE PERCENT: 54.8 % (ref 36–66)
SODIUM BLD-SCNC: 137 MMOL/L (ref 135–145)
TOTAL IMMATURE NEUTOROPHIL: 0.03 K/CU MM
WBC # BLD: 7.6 K/CU MM (ref 4–10.5)

## 2023-06-24 PROCEDURE — 85379 FIBRIN DEGRADATION QUANT: CPT

## 2023-06-24 PROCEDURE — 99284 EMERGENCY DEPT VISIT MOD MDM: CPT

## 2023-06-24 PROCEDURE — 85652 RBC SED RATE AUTOMATED: CPT

## 2023-06-24 PROCEDURE — 6370000000 HC RX 637 (ALT 250 FOR IP): Performed by: EMERGENCY MEDICINE

## 2023-06-24 PROCEDURE — 80048 BASIC METABOLIC PNL TOTAL CA: CPT

## 2023-06-24 PROCEDURE — 73590 X-RAY EXAM OF LOWER LEG: CPT

## 2023-06-24 PROCEDURE — 85025 COMPLETE CBC W/AUTO DIFF WBC: CPT

## 2023-06-24 RX ORDER — HYDROCODONE BITARTRATE AND ACETAMINOPHEN 5; 325 MG/1; MG/1
1 TABLET ORAL ONCE
Status: COMPLETED | OUTPATIENT
Start: 2023-06-24 | End: 2023-06-24

## 2023-06-24 RX ORDER — NAPROXEN 500 MG/1
500 TABLET ORAL ONCE
Status: COMPLETED | OUTPATIENT
Start: 2023-06-24 | End: 2023-06-24

## 2023-06-24 RX ADMIN — NAPROXEN 500 MG: 500 TABLET ORAL at 21:40

## 2023-06-24 RX ADMIN — HYDROCODONE BITARTRATE AND ACETAMINOPHEN 1 TABLET: 5; 325 TABLET ORAL at 21:40

## 2023-06-24 ASSESSMENT — PAIN SCALES - GENERAL
PAINLEVEL_OUTOF10: 4
PAINLEVEL_OUTOF10: 5
PAINLEVEL_OUTOF10: 5

## 2023-06-24 ASSESSMENT — LIFESTYLE VARIABLES
HOW MANY STANDARD DRINKS CONTAINING ALCOHOL DO YOU HAVE ON A TYPICAL DAY: 1 OR 2
HOW OFTEN DO YOU HAVE A DRINK CONTAINING ALCOHOL: 2-4 TIMES A MONTH

## 2023-06-24 ASSESSMENT — PAIN DESCRIPTION - DESCRIPTORS
DESCRIPTORS: DISCOMFORT
DESCRIPTORS: ACHING;DISCOMFORT;TIGHTNESS

## 2023-06-24 ASSESSMENT — PAIN DESCRIPTION - PAIN TYPE
TYPE: ACUTE PAIN
TYPE: ACUTE PAIN

## 2023-06-24 ASSESSMENT — PAIN DESCRIPTION - FREQUENCY: FREQUENCY: CONTINUOUS

## 2023-06-24 ASSESSMENT — PAIN DESCRIPTION - LOCATION
LOCATION: LEG

## 2023-06-24 ASSESSMENT — PAIN - FUNCTIONAL ASSESSMENT: PAIN_FUNCTIONAL_ASSESSMENT: 0-10

## 2023-06-24 ASSESSMENT — PAIN DESCRIPTION - ORIENTATION
ORIENTATION: RIGHT

## 2023-06-25 ASSESSMENT — ENCOUNTER SYMPTOMS
RESPIRATORY NEGATIVE: 1
GASTROINTESTINAL NEGATIVE: 1
EYES NEGATIVE: 1

## 2023-07-07 ENCOUNTER — OFFICE VISIT (OUTPATIENT)
Dept: INTERNAL MEDICINE CLINIC | Age: 33
End: 2023-07-07
Payer: COMMERCIAL

## 2023-07-07 VITALS — WEIGHT: 278 LBS | HEART RATE: 82 BPM | OXYGEN SATURATION: 99 % | TEMPERATURE: 97.2 F | BODY MASS INDEX: 47.72 KG/M2

## 2023-07-07 DIAGNOSIS — R05.1 ACUTE COUGH: Primary | ICD-10-CM

## 2023-07-07 DIAGNOSIS — R21 RASH OF BOTH HANDS: ICD-10-CM

## 2023-07-07 PROCEDURE — 99213 OFFICE O/P EST LOW 20 MIN: CPT | Performed by: PHYSICIAN ASSISTANT

## 2023-07-07 RX ORDER — DEXTROMETHORPHAN HYDROBROMIDE AND PROMETHAZINE HYDROCHLORIDE 15; 6.25 MG/5ML; MG/5ML
5 SYRUP ORAL NIGHTLY PRN
Qty: 240 ML | Refills: 0 | Status: SHIPPED | OUTPATIENT
Start: 2023-07-07 | End: 2023-07-14

## 2023-07-07 RX ORDER — ALBUTEROL SULFATE 90 UG/1
2 AEROSOL, METERED RESPIRATORY (INHALATION) 4 TIMES DAILY PRN
Qty: 18 G | Refills: 5 | Status: SHIPPED | OUTPATIENT
Start: 2023-07-07

## 2023-07-07 RX ORDER — PREDNISONE 20 MG/1
TABLET ORAL
Qty: 10 TABLET | Refills: 0 | Status: SHIPPED | OUTPATIENT
Start: 2023-07-07

## 2023-07-07 RX ORDER — TRIAMCINOLONE ACETONIDE 0.25 MG/G
CREAM TOPICAL
Qty: 15 G | Refills: 0 | Status: SHIPPED | OUTPATIENT
Start: 2023-07-07

## 2023-07-07 RX ORDER — AZITHROMYCIN 250 MG/1
250 TABLET, FILM COATED ORAL SEE ADMIN INSTRUCTIONS
Qty: 6 TABLET | Refills: 0 | Status: SHIPPED | OUTPATIENT
Start: 2023-07-07 | End: 2023-07-12

## 2023-07-07 RX ORDER — BENZONATATE 200 MG/1
200 CAPSULE ORAL 3 TIMES DAILY PRN
Qty: 30 CAPSULE | Refills: 0 | Status: SHIPPED | OUTPATIENT
Start: 2023-07-07 | End: 2023-07-14

## 2023-07-07 ASSESSMENT — ENCOUNTER SYMPTOMS
PHOTOPHOBIA: 0
EYE PAIN: 0
COUGH: 1
BLOOD IN STOOL: 0
EYE DISCHARGE: 0
EYE REDNESS: 0
CONSTIPATION: 0
VOMITING: 0
SORE THROAT: 0
RHINORRHEA: 0
SHORTNESS OF BREATH: 1
NAUSEA: 0
ABDOMINAL PAIN: 0
WHEEZING: 0
DIARRHEA: 0
BACK PAIN: 0
CHEST TIGHTNESS: 1
COLOR CHANGE: 0

## 2023-07-07 NOTE — PROGRESS NOTES
Lex Huerta (:  1990) is a 29 y.o. female,Established patient, here for evaluation of the following chief complaint(s):    Cough (2.5 weeks) and Chest Congestion      SUBJECTIVE/OBJECTIVE:  DOMI Huerta is a pleasant 29 y.o. female presenting to clinic today for cough. Patient reports ongoing cough for the past 2 and half weeks; reports worsening over the past week; reports initially cough was dry however has been more productive of purulent sputum over the past week; reports slight dyspnea on exertion; reports cough is worse with deep breathing; denies any chest pains or shortness of breath at rest.  Denies any fevers, denies any other symptoms of upper respiratory infection. Reports benefit with as needed use of inhaler. Reports symptoms began with smoking. Poor air quality. Patient also reports that whenever she gets sick like this she develops a pruritic erythematous rash to fingers. Allergies   Allergen Reactions    Amoxicillin Hives and Swelling       Current Outpatient Medications   Medication Sig Dispense Refill    predniSONE (DELTASONE) 20 MG tablet Take 40 mg (2 tabs) x 5 days 10 tablet 0    azithromycin (ZITHROMAX) 250 MG tablet Take 1 tablet by mouth See Admin Instructions for 5 days 500mg on day 1 followed by 250mg on days 2 - 5 6 tablet 0    promethazine-dextromethorphan (PROMETHAZINE-DM) 6.25-15 MG/5ML syrup Take 5 mLs by mouth nightly as needed for Cough 240 mL 0    albuterol sulfate HFA (VENTOLIN HFA) 108 (90 Base) MCG/ACT inhaler Inhale 2 puffs into the lungs 4 times daily as needed for Wheezing 18 g 5    benzonatate (TESSALON) 200 MG capsule Take 1 capsule by mouth 3 times daily as needed for Cough 30 capsule 0    triamcinolone (KENALOG) 0.025 % cream Apply topically 2 times daily.  15 g 0    ondansetron (ZOFRAN ODT) 4 MG disintegrating tablet Take 1 tablet by mouth every 8 hours as needed for Nausea 30 tablet 0    ibuprofen (ADVIL;MOTRIN) 800 MG tablet Take 1 tablet by

## 2023-11-28 ENCOUNTER — OFFICE VISIT (OUTPATIENT)
Dept: INTERNAL MEDICINE CLINIC | Age: 33
End: 2023-11-28
Payer: COMMERCIAL

## 2023-11-28 VITALS
WEIGHT: 278 LBS | BODY MASS INDEX: 47.46 KG/M2 | HEIGHT: 64 IN | TEMPERATURE: 99.4 F | OXYGEN SATURATION: 99 % | HEART RATE: 90 BPM

## 2023-11-28 DIAGNOSIS — M79.10 MYALGIA: ICD-10-CM

## 2023-11-28 DIAGNOSIS — B34.9 VIRAL ILLNESS: ICD-10-CM

## 2023-11-28 DIAGNOSIS — R11.2 NAUSEA AND VOMITING, UNSPECIFIED VOMITING TYPE: Primary | ICD-10-CM

## 2023-11-28 LAB
INFLUENZA A ANTIBODY: NEGATIVE
INFLUENZA B ANTIBODY: NEGATIVE
Lab: NORMAL
PERFORMING INSTRUMENT: NORMAL
QC PASS/FAIL: NORMAL
SARS-COV-2, POC: NORMAL

## 2023-11-28 PROCEDURE — 99213 OFFICE O/P EST LOW 20 MIN: CPT | Performed by: NURSE PRACTITIONER

## 2023-11-28 PROCEDURE — 87804 INFLUENZA ASSAY W/OPTIC: CPT | Performed by: NURSE PRACTITIONER

## 2023-11-28 PROCEDURE — 87426 SARSCOV CORONAVIRUS AG IA: CPT | Performed by: NURSE PRACTITIONER

## 2023-11-28 RX ORDER — ONDANSETRON 4 MG/1
4 TABLET, ORALLY DISINTEGRATING ORAL 3 TIMES DAILY PRN
Qty: 21 TABLET | Refills: 0 | Status: SHIPPED | OUTPATIENT
Start: 2023-11-28

## 2023-11-28 ASSESSMENT — ENCOUNTER SYMPTOMS
ABDOMINAL PAIN: 1
SHORTNESS OF BREATH: 0
DIARRHEA: 1
BLOOD IN STOOL: 0
CHEST TIGHTNESS: 0
SINUS PAIN: 1
VOMITING: 1
SWOLLEN GLANDS: 0
WHEEZING: 0
SORE THROAT: 0
COUGH: 0
RHINORRHEA: 0
SINUS PRESSURE: 1
ANAL BLEEDING: 0
CONSTIPATION: 0
NAUSEA: 1
RECTAL PAIN: 0
ABDOMINAL DISTENTION: 0

## 2023-11-28 NOTE — PROGRESS NOTES
Michell Ahmadi (:  1990) is a 29 y.o. female,Established patient, here for evaluation of the following chief complaint(s):    Headache (Started last night. All family sick), Nausea & Vomiting, Diarrhea, and Fever      SUBJECTIVE/OBJECTIVE:  Pt presents with c/o as stated below - onset early this am - all family members present at [facility] are ill. Denies any recent travel. Previous PCP has left practice and is currently without PCP. URI   This is a new problem. The current episode started today. The problem has been unchanged. There has been no fever (t- max 99.4). Associated symptoms include abdominal pain (genralized), diarrhea (no blood or mucus), headaches, nausea, neck pain, sinus pain and vomiting. Pertinent negatives include no chest pain, congestion, coughing, dysuria, ear pain, joint pain, joint swelling, plugged ear sensation, rash, rhinorrhea, sneezing, sore throat, swollen glands or wheezing. She has tried nothing for the symptoms. Allergies   Allergen Reactions    Amoxicillin Hives and Swelling        Current Outpatient Medications   Medication Sig Dispense Refill    ondansetron (ZOFRAN-ODT) 4 MG disintegrating tablet Take 1 tablet by mouth 3 times daily as needed for Nausea or Vomiting 21 tablet 0    predniSONE (DELTASONE) 20 MG tablet Take 40 mg (2 tabs) x 5 days 10 tablet 0    albuterol sulfate HFA (VENTOLIN HFA) 108 (90 Base) MCG/ACT inhaler Inhale 2 puffs into the lungs 4 times daily as needed for Wheezing 18 g 5    triamcinolone (KENALOG) 0.025 % cream Apply topically 2 times daily. 15 g 0    ibuprofen (ADVIL;MOTRIN) 800 MG tablet Take 1 tablet by mouth every 8 hours as needed for Pain or Fever 30 tablet 0    magnesium 30 MG tablet Take 1 tablet by mouth 2 times daily      Potassium 75 MG TABS Take by mouth      loratadine (CLARITIN) 10 MG tablet Take 1 tablet by mouth daily      Multiple Vitamins-Minerals (MULTI FOR HER PO) Take  by mouth.        No current

## 2023-11-28 NOTE — PATIENT INSTRUCTIONS
To prevent dehydration, drink plenty of fluids. Choose water and other clear liquids until you feel better. If you have kidney, heart, or liver disease and have to limit fluids, talk with your doctor before you increase the amount of fluids you drink. Rest in bed until you feel better. When you are able to eat, try clear soups, mild foods, and liquids until all symptoms are gone for 12 to 48 hours.  Other good choices include dry toast, crackers, cooked cereal, and gelatin dessert, such as Jell-O.

## 2024-08-27 ENCOUNTER — HOSPITAL ENCOUNTER (OUTPATIENT)
Age: 34
Discharge: HOME | End: 2024-08-27
Payer: COMMERCIAL

## 2024-08-27 ENCOUNTER — OFFICE VISIT (OUTPATIENT)
Age: 34
End: 2024-08-27
Payer: COMMERCIAL

## 2024-08-27 VITALS
WEIGHT: 286 LBS | HEART RATE: 63 BPM | DIASTOLIC BLOOD PRESSURE: 70 MMHG | HEIGHT: 64 IN | OXYGEN SATURATION: 98 % | BODY MASS INDEX: 48.83 KG/M2 | SYSTOLIC BLOOD PRESSURE: 104 MMHG

## 2024-08-27 DIAGNOSIS — R42 EPISODIC LIGHTHEADEDNESS: Primary | ICD-10-CM

## 2024-08-27 DIAGNOSIS — R09.89 SYMPTOMS OF UPPER RESPIRATORY INFECTION (URI): ICD-10-CM

## 2024-08-27 DIAGNOSIS — R42 EPISODIC LIGHTHEADEDNESS: ICD-10-CM

## 2024-08-27 LAB
ALBUMIN SERPL-MCNC: 3.9 GM/DL (ref 3.4–5)
ALP BLD-CCNC: 81 IU/L (ref 40–129)
ALT SERPL-CCNC: 10 U/L (ref 10–40)
ANION GAP SERPL CALCULATED.3IONS-SCNC: 12 MMOL/L (ref 7–16)
AST SERPL-CCNC: 13 IU/L (ref 15–37)
BASOPHILS ABSOLUTE: 0.1 K/CU MM
BASOPHILS RELATIVE PERCENT: 0.9 % (ref 0–1)
BILIRUB SERPL-MCNC: 0.2 MG/DL (ref 0–1)
BUN SERPL-MCNC: 15 MG/DL (ref 6–23)
CALCIUM SERPL-MCNC: 9.1 MG/DL (ref 8.3–10.6)
CHLORIDE BLD-SCNC: 103 MMOL/L (ref 99–110)
CO2: 24 MMOL/L (ref 21–32)
CREAT SERPL-MCNC: 1.1 MG/DL (ref 0.6–1.1)
DIFFERENTIAL TYPE: ABNORMAL
EOSINOPHILS ABSOLUTE: 0.4 K/CU MM
EOSINOPHILS RELATIVE PERCENT: 6.7 % (ref 0–3)
GFR, ESTIMATED: 69 ML/MIN/1.73M2
GLUCOSE SERPL-MCNC: 93 MG/DL (ref 70–99)
HCT VFR BLD CALC: 40.6 % (ref 37–47)
HEMOGLOBIN: 13.1 GM/DL (ref 12.5–16)
IMMATURE NEUTROPHIL %: 0.5 % (ref 0–0.43)
LYMPHOCYTES ABSOLUTE: 2.2 K/CU MM
LYMPHOCYTES RELATIVE PERCENT: 37.1 % (ref 24–44)
MAGNESIUM: 2.1 MG/DL (ref 1.8–2.4)
MCH RBC QN AUTO: 27.8 PG (ref 27–31)
MCHC RBC AUTO-ENTMCNC: 32.3 % (ref 32–36)
MCV RBC AUTO: 86 FL (ref 78–100)
MONOCYTES ABSOLUTE: 0.5 K/CU MM
MONOCYTES RELATIVE PERCENT: 8.8 % (ref 0–4)
NEUTROPHILS ABSOLUTE: 2.7 K/CU MM
NEUTROPHILS RELATIVE PERCENT: 46 % (ref 36–66)
PDW BLD-RTO: 12.7 % (ref 11.7–14.9)
PLATELET # BLD: 276 K/CU MM (ref 140–440)
PMV BLD AUTO: 9.4 FL (ref 7.5–11.1)
POTASSIUM SERPL-SCNC: 4.4 MMOL/L (ref 3.5–5.1)
RBC # BLD: 4.72 M/CU MM (ref 4.2–5.4)
SODIUM BLD-SCNC: 139 MMOL/L (ref 135–145)
TOTAL IMMATURE NEUTOROPHIL: 0.03 K/CU MM
TOTAL PROTEIN: 6.9 GM/DL (ref 6.4–8.2)
WBC # BLD: 5.8 K/CU MM (ref 4–10.5)

## 2024-08-27 PROCEDURE — 99214 OFFICE O/P EST MOD 30 MIN: CPT | Performed by: PHYSICIAN ASSISTANT

## 2024-08-27 PROCEDURE — 36415 COLL VENOUS BLD VENIPUNCTURE: CPT

## 2024-08-27 PROCEDURE — 83735 ASSAY OF MAGNESIUM: CPT

## 2024-08-27 PROCEDURE — 93000 ELECTROCARDIOGRAM COMPLETE: CPT | Performed by: PHYSICIAN ASSISTANT

## 2024-08-27 PROCEDURE — 80053 COMPREHEN METABOLIC PANEL: CPT

## 2024-08-27 PROCEDURE — 85025 COMPLETE CBC W/AUTO DIFF WBC: CPT

## 2024-08-27 RX ORDER — DOXYCYCLINE HYCLATE 100 MG
100 TABLET ORAL 2 TIMES DAILY
Qty: 20 TABLET | Refills: 0 | Status: SHIPPED | OUTPATIENT
Start: 2024-08-27 | End: 2024-08-29 | Stop reason: SINTOL

## 2024-08-27 ASSESSMENT — ENCOUNTER SYMPTOMS
BLOOD IN STOOL: 0
VOMITING: 0
NAUSEA: 0
BACK PAIN: 0
CHEST TIGHTNESS: 0
SINUS PRESSURE: 1
COLOR CHANGE: 0
CONSTIPATION: 0
EYE DISCHARGE: 0
EYE REDNESS: 0
DIARRHEA: 0
PHOTOPHOBIA: 0
SHORTNESS OF BREATH: 0
COUGH: 0
SORE THROAT: 0
WHEEZING: 0
ABDOMINAL PAIN: 0
RHINORRHEA: 0
EYE PAIN: 0

## 2024-08-27 NOTE — RESULT ENCOUNTER NOTE
Reviewed results with patient via telephone, advised patient to increase intake of water with electrolytes, eosinophils mildly elevated, continue with daily antihistamine, Flonase, trial doxycycline as prescribed for possible sinus infection etc.  Reviewed return precautions.

## 2024-08-27 NOTE — PATIENT INSTRUCTIONS
Thank you for choosing [facility] and Walk In with REGINA Gilliam!! You will receive a survey in the mail regarding the care you received during your stay. Your input is valuable to us. Our goal is that the care you received was excellent. If we did not meat this goal, please let us know how we can become excellent. We hope that you will definitely recommend us to your friends and family and choose us for your future healthcare needs. There is no greater compliment than a referral.     Have a wonderful day!!  Lucina Luu CMA, AEMT, CPI Instructor

## 2024-08-27 NOTE — PROGRESS NOTES
Elvia Jacques (:  1990) is a 33 y.o. female,Established patient, here for evaluation of the following chief complaint(s):    Dizziness (For las few weeks. Also having headache and SoB. )      SUBJECTIVE/OBJECTIVE:  HPI  Elvia Jacques is a pleasant 33 y.o. female presenting to clinic today for lightheadedness episodes.  Patient reports for about the past month she has been having lightheadedness/dizziness/imbalance episodes which occur on average about once daily; reports these typically do occur with head movements or with standing up from sitting quickly; reports that she has also been having flareup of seasonal allergy symptoms and just started using Flonase again yesterday.  Reports nasal congestion, sinus pressure, bilateral ear pressure and popping.  Reports that these episodes come on typically while at work and are accompanied with tunnel vision and ear ringing, states she has not had a syncopal episode.  States she typically sits down and rests and feels better after a few minutes.  Reports that episode at work today was more severe and she was told she needed to be seen, reports blood pressure at work was 122/64 when she was checked by medical station, reports she went home and blood pressure was 90/60..     Patient had similar episode about 6 years ago, was evaluated by cardiology, stress test and echo were reassuring.    Allergies   Allergen Reactions    Amoxicillin Hives and Swelling       Current Outpatient Medications   Medication Sig Dispense Refill    doxycycline hyclate (VIBRA-TABS) 100 MG tablet Take 1 tablet by mouth 2 times daily for 10 days 20 tablet 0    ondansetron (ZOFRAN-ODT) 4 MG disintegrating tablet Take 1 tablet by mouth 3 times daily as needed for Nausea or Vomiting 21 tablet 0    predniSONE (DELTASONE) 20 MG tablet Take 40 mg (2 tabs) x 5 days 10 tablet 0    albuterol sulfate HFA (VENTOLIN HFA) 108 (90 Base) MCG/ACT inhaler Inhale 2 puffs into the lungs 4 times daily as needed

## 2024-08-29 ENCOUNTER — TELEPHONE (OUTPATIENT)
Age: 34
End: 2024-08-29

## 2024-08-29 ENCOUNTER — OFFICE VISIT (OUTPATIENT)
Age: 34
End: 2024-08-29
Payer: COMMERCIAL

## 2024-08-29 VITALS
SYSTOLIC BLOOD PRESSURE: 108 MMHG | DIASTOLIC BLOOD PRESSURE: 72 MMHG | TEMPERATURE: 99.1 F | HEART RATE: 87 BPM | OXYGEN SATURATION: 97 %

## 2024-08-29 DIAGNOSIS — H66.92 LEFT OTITIS MEDIA, UNSPECIFIED OTITIS MEDIA TYPE: ICD-10-CM

## 2024-08-29 DIAGNOSIS — R05.1 ACUTE COUGH: ICD-10-CM

## 2024-08-29 DIAGNOSIS — U07.1 COVID-19: Primary | ICD-10-CM

## 2024-08-29 DIAGNOSIS — J10.1 INFLUENZA A: ICD-10-CM

## 2024-08-29 DIAGNOSIS — R09.81 NASAL CONGESTION: ICD-10-CM

## 2024-08-29 DIAGNOSIS — J02.9 SORE THROAT: ICD-10-CM

## 2024-08-29 LAB
INFLUENZA A ANTIGEN, POC: POSITIVE
INFLUENZA B ANTIGEN, POC: NEGATIVE
LOT EXPIRE DATE: ABNORMAL
LOT KIT NUMBER: ABNORMAL
S PYO AG THROAT QL: NORMAL
SARS-COV-2, POC: DETECTED
VALID INTERNAL CONTROL: ABNORMAL
VENDOR AND KIT NAME POC: ABNORMAL

## 2024-08-29 PROCEDURE — 87428 SARSCOV & INF VIR A&B AG IA: CPT | Performed by: NURSE PRACTITIONER

## 2024-08-29 PROCEDURE — 87880 STREP A ASSAY W/OPTIC: CPT | Performed by: NURSE PRACTITIONER

## 2024-08-29 RX ORDER — AZITHROMYCIN 250 MG/1
TABLET, FILM COATED ORAL
Qty: 6 TABLET | Refills: 0 | Status: SHIPPED | OUTPATIENT
Start: 2024-08-29 | End: 2024-09-08

## 2024-08-29 RX ORDER — BENZONATATE 100 MG/1
100 CAPSULE ORAL 3 TIMES DAILY PRN
Qty: 30 CAPSULE | Refills: 0 | Status: SHIPPED | OUTPATIENT
Start: 2024-08-29 | End: 2024-09-08

## 2024-08-29 ASSESSMENT — ENCOUNTER SYMPTOMS
SORE THROAT: 1
VOMITING: 0
DIARRHEA: 0
ABDOMINAL PAIN: 0
WHEEZING: 0
SHORTNESS OF BREATH: 0
RHINORRHEA: 1
NAUSEA: 0
SINUS PAIN: 1
STRIDOR: 0
COUGH: 1
SWOLLEN GLANDS: 0

## 2024-08-29 NOTE — PROGRESS NOTES
Elvia Jacques (:  1990) is a 33 y.o. female,Established patient, here for evaluation of the following chief complaint(s):    Pharyngitis and Otalgia      SUBJECTIVE/OBJECTIVE:  Pt presents with concerns as stated below - was seen in clinic 2 days ago and treated for sinus infection with doxycycline and now has developed possible side effects - eyes were swollen last night - stopped taking medication and eyes are better - now has new symptoms including ear pain radiating into neck - and cough - denies any recent travels or known ill contacts- does work in a factory     URI   This is a new problem. The current episode started in the past 7 days. The problem has been gradually worsening. There has been no fever. Associated symptoms include congestion (chest and nasal), coughing, ear pain (bilateral), headaches, neck pain (bilateral -radiating from ears), a plugged ear sensation, rhinorrhea (occasionally), sinus pain, sneezing and a sore throat. Pertinent negatives include no abdominal pain, chest pain, diarrhea, dysuria, joint pain, joint swelling, nausea, rash, swollen glands, vomiting or wheezing. She has tried acetaminophen, NSAIDs, decongestant and antihistamine (tylenol severe sinus medication) for the symptoms. The treatment provided mild relief.       Allergies   Allergen Reactions    Amoxicillin Hives and Swelling        Current Outpatient Medications   Medication Sig Dispense Refill    benzonatate (TESSALON) 100 MG capsule Take 1 capsule by mouth 3 times daily as needed for Cough 30 capsule 0    azithromycin (ZITHROMAX) 250 MG tablet 500mg on day 1 followed by 250mg on days 2 - 5 6 tablet 0    ondansetron (ZOFRAN-ODT) 4 MG disintegrating tablet Take 1 tablet by mouth 3 times daily as needed for Nausea or Vomiting 21 tablet 0    predniSONE (DELTASONE) 20 MG tablet Take 40 mg (2 tabs) x 5 days 10 tablet 0    albuterol sulfate HFA (VENTOLIN HFA) 108 (90 Base) MCG/ACT inhaler Inhale 2 puffs into the lungs

## 2024-08-29 NOTE — TELEPHONE ENCOUNTER
Pt called and states she believes she is having a reaction to the ATB, woke up and her eyes are slightly swollen. Pt also states she has developed new symptoms of ear pain that is radiating down her neck. Pt was scheduled to be re-evaluated in office today.

## 2024-09-02 ENCOUNTER — HOSPITAL ENCOUNTER (EMERGENCY)
Age: 34
Discharge: HOME | End: 2024-09-02
Attending: STUDENT IN AN ORGANIZED HEALTH CARE EDUCATION/TRAINING PROGRAM
Payer: COMMERCIAL

## 2024-09-02 ENCOUNTER — APPOINTMENT (OUTPATIENT)
Dept: GENERAL RADIOLOGY | Age: 34
End: 2024-09-02
Payer: COMMERCIAL

## 2024-09-02 VITALS
DIASTOLIC BLOOD PRESSURE: 66 MMHG | WEIGHT: 286 LBS | BODY MASS INDEX: 48.83 KG/M2 | HEIGHT: 64 IN | SYSTOLIC BLOOD PRESSURE: 108 MMHG | HEART RATE: 69 BPM | TEMPERATURE: 97.6 F | RESPIRATION RATE: 17 BRPM | OXYGEN SATURATION: 100 %

## 2024-09-02 DIAGNOSIS — R07.9 CHEST PAIN, UNSPECIFIED TYPE: ICD-10-CM

## 2024-09-02 DIAGNOSIS — U07.1 COVID-19 VIRUS INFECTION: ICD-10-CM

## 2024-09-02 DIAGNOSIS — J06.9 VIRAL URI WITH COUGH: Primary | ICD-10-CM

## 2024-09-02 PROCEDURE — 6370000000 HC RX 637 (ALT 250 FOR IP): Performed by: STUDENT IN AN ORGANIZED HEALTH CARE EDUCATION/TRAINING PROGRAM

## 2024-09-02 PROCEDURE — 99284 EMERGENCY DEPT VISIT MOD MDM: CPT

## 2024-09-02 PROCEDURE — 71046 X-RAY EXAM CHEST 2 VIEWS: CPT

## 2024-09-02 PROCEDURE — 93005 ELECTROCARDIOGRAM TRACING: CPT | Performed by: STUDENT IN AN ORGANIZED HEALTH CARE EDUCATION/TRAINING PROGRAM

## 2024-09-02 RX ORDER — IBUPROFEN 600 MG/1
600 TABLET, FILM COATED ORAL
Status: COMPLETED | OUTPATIENT
Start: 2024-09-02 | End: 2024-09-02

## 2024-09-02 RX ORDER — IBUPROFEN 400 MG/1
400 TABLET, FILM COATED ORAL EVERY 6 HOURS PRN
Qty: 14 TABLET | Refills: 0 | Status: SHIPPED | OUTPATIENT
Start: 2024-09-02

## 2024-09-02 RX ORDER — CETIRIZINE HYDROCHLORIDE 10 MG/1
10 TABLET ORAL DAILY
COMMUNITY

## 2024-09-02 RX ORDER — CALCIUM CARBONATE 260MG(650)
TABLET,CHEWABLE ORAL
COMMUNITY

## 2024-09-02 RX ORDER — FLUTICASONE PROPIONATE 50 MCG
1 SPRAY, SUSPENSION (ML) NASAL DAILY
COMMUNITY

## 2024-09-02 RX ADMIN — IBUPROFEN 600 MG: 600 TABLET, FILM COATED ORAL at 15:26

## 2024-09-02 ASSESSMENT — PAIN SCALES - GENERAL
PAINLEVEL_OUTOF10: 2
PAINLEVEL_OUTOF10: 3

## 2024-09-02 ASSESSMENT — PAIN - FUNCTIONAL ASSESSMENT: PAIN_FUNCTIONAL_ASSESSMENT: 0-10

## 2024-09-02 ASSESSMENT — PAIN DESCRIPTION - DESCRIPTORS: DESCRIPTORS: ACHING

## 2024-09-02 ASSESSMENT — PAIN DESCRIPTION - LOCATION
LOCATION: CHEST
LOCATION: GENERALIZED

## 2024-09-02 ASSESSMENT — PAIN DESCRIPTION - PAIN TYPE: TYPE: ACUTE PAIN

## 2024-09-02 NOTE — ED PROVIDER NOTES
9/2/2024  Chest X-ray INDICATION: Chest pain COMPARISON: September 15, 2021 TECHNIQUE: PA and lateral views of the chest were obtained. FINDINGS: The lungs are clear. There are no infiltrates or effusions. Cardiac size and pulmonary vascular markings are normal.  The bony thorax is intact. There are mild osteoarthritic changes in the mid to lower thoracic spine.     No acute findings in the chest Electronically signed by Riley Trevino        Clinical Impression:  1. Viral URI with cough    2. COVID-19 virus infection    3. Chest pain, unspecified type      Disposition referral (if applicable):  Joie Briones, APRN - CNP  [address] [facility] [address]  [phone]    Call today  To arrange for a follow-up appointment for reevaluation    [facility]  [address] [place] [id number] [phone]  Go today  If symptoms worsen    Disposition medications (if applicable):  New Prescriptions    IBUPROFEN (IBU) 400 MG TABLET    Take 1 tablet by mouth every 6 hours as needed for Pain     ED Provider Disposition Time  DISPOSITION Decision To Discharge 09/02/2024 03:40:18 PM  Condition at Disposition: Stable      Comment: Please note this report has been produced using speech recognition software and may contain errors related to that system including errors in grammar, punctuation, and spelling, as well as words and phrases that may be inappropriate.  Efforts were made to edit the dictations.        Unruly Sanon DO  09/02/24 1179

## 2024-09-02 NOTE — DISCHARGE INSTRUCTIONS
Problem: Patient Care Overview (Pediatrics)  Goal: Discharge Needs Assessment  Outcome: Ongoing (interventions implemented as appropriate)   01/10/18 0859   Discharge Needs Assessment   Concerns To Be Addressed no discharge needs identified          Your chest x-ray results were normal.    Elvia: Here are some specific instructions about taking Tylenol and/or ibuprofen.       acetaminophen (Tylenol) is used for fever and pain.  It comes in regular strength (325 mg) and extra strength (500 mg).  I typically recommend two of the ES (500 mg) tablets every 4-6 hours.  However, do not take more than 3 total doses (3,000 mg) in a 24-hour period. Also, do not take if you have any liver problems.        ibuprofen (Motrin or Advil) is used for fever, pain, and/or inflammation.   It is considered an NSAID (Non-Steroidal Anti-Inflamatory Drug). They come in 200 mg tabs/capsules, and can be dosed two tablets (400 mg total) every 6-8 hours.               On rare occasion, NSAIDS can cause gastric (stomach) irritation such as gastritis, and even ulcers. So, avoid these if you develop upper abdominal discomfort or heartburn. Some people will take anti-ulcer medication (like Pepcid, Zantac, or Prilosec) and maybe TUMS, while taking NSAIDs to minimize any stomach irritation.

## 2024-09-03 LAB
EKG ATRIAL RATE: 55 BPM
EKG DIAGNOSIS: NORMAL
EKG P AXIS: 22 DEGREES
EKG P-R INTERVAL: 172 MS
EKG Q-T INTERVAL: 432 MS
EKG QRS DURATION: 78 MS
EKG QTC CALCULATION (BAZETT): 413 MS
EKG R AXIS: 29 DEGREES
EKG T AXIS: 30 DEGREES
EKG VENTRICULAR RATE: 55 BPM

## 2024-09-03 PROCEDURE — 93010 ELECTROCARDIOGRAM REPORT: CPT | Performed by: INTERNAL MEDICINE

## 2024-09-06 ENCOUNTER — TELEPHONE (OUTPATIENT)
Age: 34
End: 2024-09-06

## 2024-09-11 ENCOUNTER — TELEPHONE (OUTPATIENT)
Age: 34
End: 2024-09-11

## 2024-09-11 ENCOUNTER — OFFICE VISIT (OUTPATIENT)
Age: 34
End: 2024-09-11
Payer: COMMERCIAL

## 2024-09-11 VITALS
WEIGHT: 286.6 LBS | OXYGEN SATURATION: 98 % | SYSTOLIC BLOOD PRESSURE: 126 MMHG | DIASTOLIC BLOOD PRESSURE: 74 MMHG | HEART RATE: 88 BPM | BODY MASS INDEX: 48.93 KG/M2 | HEIGHT: 64 IN

## 2024-09-11 DIAGNOSIS — I49.9 IRREGULAR HEART RATE: ICD-10-CM

## 2024-09-11 DIAGNOSIS — I49.9 IRREGULAR HEART BEATS: ICD-10-CM

## 2024-09-11 DIAGNOSIS — R11.0 NAUSEA: ICD-10-CM

## 2024-09-11 DIAGNOSIS — R42 DIZZINESS: Primary | ICD-10-CM

## 2024-09-11 LAB
BILIRUBIN, POC: ABNORMAL
BLOOD URINE, POC: ABNORMAL
CLARITY, POC: ABNORMAL
COLOR, POC: ABNORMAL
GLUCOSE URINE, POC: ABNORMAL MG/DL
KETONES, POC: ABNORMAL MG/DL
LEUKOCYTE EST, POC: ABNORMAL
NITRITE, POC: ABNORMAL
PH, POC: 6
PROTEIN, POC: ABNORMAL MG/DL
SPECIFIC GRAVITY, POC: >=1.03
UROBILINOGEN, POC: ABNORMAL MG/DL

## 2024-09-11 PROCEDURE — 81002 URINALYSIS NONAUTO W/O SCOPE: CPT

## 2024-09-11 PROCEDURE — 99213 OFFICE O/P EST LOW 20 MIN: CPT

## 2024-09-11 PROCEDURE — 36415 COLL VENOUS BLD VENIPUNCTURE: CPT

## 2024-09-11 PROCEDURE — 93000 ELECTROCARDIOGRAM COMPLETE: CPT

## 2024-09-11 RX ORDER — ONDANSETRON 4 MG/1
4 TABLET, ORALLY DISINTEGRATING ORAL 3 TIMES DAILY PRN
Qty: 21 TABLET | Refills: 0 | Status: SHIPPED | OUTPATIENT
Start: 2024-09-11 | End: 2024-10-10

## 2024-09-11 SDOH — ECONOMIC STABILITY: FOOD INSECURITY: WITHIN THE PAST 12 MONTHS, THE FOOD YOU BOUGHT JUST DIDN'T LAST AND YOU DIDN'T HAVE MONEY TO GET MORE.: NEVER TRUE

## 2024-09-11 SDOH — ECONOMIC STABILITY: FOOD INSECURITY: WITHIN THE PAST 12 MONTHS, YOU WORRIED THAT YOUR FOOD WOULD RUN OUT BEFORE YOU GOT MONEY TO BUY MORE.: NEVER TRUE

## 2024-09-11 SDOH — ECONOMIC STABILITY: INCOME INSECURITY: HOW HARD IS IT FOR YOU TO PAY FOR THE VERY BASICS LIKE FOOD, HOUSING, MEDICAL CARE, AND HEATING?: NOT HARD AT ALL

## 2024-09-11 ASSESSMENT — PATIENT HEALTH QUESTIONNAIRE - PHQ9
1. LITTLE INTEREST OR PLEASURE IN DOING THINGS: NOT AT ALL
SUM OF ALL RESPONSES TO PHQ QUESTIONS 1-9: 0
SUM OF ALL RESPONSES TO PHQ QUESTIONS 1-9: 0
SUM OF ALL RESPONSES TO PHQ9 QUESTIONS 1 & 2: 0
SUM OF ALL RESPONSES TO PHQ QUESTIONS 1-9: 0
2. FEELING DOWN, DEPRESSED OR HOPELESS: NOT AT ALL
SUM OF ALL RESPONSES TO PHQ QUESTIONS 1-9: 0

## 2024-09-12 LAB
ALBUMIN SERPL-MCNC: 4.1 G/DL (ref 3.4–5)
ALBUMIN/GLOB SERPL: 1.7 {RATIO} (ref 1.1–2.2)
ALP SERPL-CCNC: 81 U/L (ref 40–129)
ALT SERPL-CCNC: 16 U/L (ref 10–40)
ANION GAP SERPL CALCULATED.3IONS-SCNC: 11 MMOL/L (ref 3–16)
AST SERPL-CCNC: 16 U/L (ref 15–37)
BASOPHILS # BLD: 0 K/UL (ref 0–0.2)
BASOPHILS NFR BLD: 0.7 %
BILIRUB SERPL-MCNC: <0.2 MG/DL (ref 0–1)
BUN SERPL-MCNC: 16 MG/DL (ref 7–20)
CALCIUM SERPL-MCNC: 9.5 MG/DL (ref 8.3–10.6)
CHLORIDE SERPL-SCNC: 106 MMOL/L (ref 99–110)
CO2 SERPL-SCNC: 25 MMOL/L (ref 21–32)
CREAT SERPL-MCNC: 1 MG/DL (ref 0.6–1.1)
DEPRECATED RDW RBC AUTO: 13.9 % (ref 12.4–15.4)
EOSINOPHIL # BLD: 0.5 K/UL (ref 0–0.6)
EOSINOPHIL NFR BLD: 6.8 %
GFR SERPLBLD CREATININE-BSD FMLA CKD-EPI: 77 ML/MIN/{1.73_M2}
GLUCOSE SERPL-MCNC: 71 MG/DL (ref 70–99)
HCT VFR BLD AUTO: 38.3 % (ref 36–48)
HGB BLD-MCNC: 12.9 G/DL (ref 12–16)
LYMPHOCYTES # BLD: 2.3 K/UL (ref 1–5.1)
LYMPHOCYTES NFR BLD: 32.7 %
MCH RBC QN AUTO: 28.1 PG (ref 26–34)
MCHC RBC AUTO-ENTMCNC: 33.6 G/DL (ref 31–36)
MCV RBC AUTO: 83.4 FL (ref 80–100)
MONOCYTES # BLD: 0.4 K/UL (ref 0–1.3)
MONOCYTES NFR BLD: 6 %
NEUTROPHILS # BLD: 3.8 K/UL (ref 1.7–7.7)
NEUTROPHILS NFR BLD: 53.8 %
PLATELET # BLD AUTO: 239 K/UL (ref 135–450)
PMV BLD AUTO: 8.7 FL (ref 5–10.5)
POTASSIUM SERPL-SCNC: 4.2 MMOL/L (ref 3.5–5.1)
PROT SERPL-MCNC: 6.5 G/DL (ref 6.4–8.2)
RBC # BLD AUTO: 4.58 M/UL (ref 4–5.2)
SODIUM SERPL-SCNC: 142 MMOL/L (ref 136–145)
TSH SERPL DL<=0.005 MIU/L-ACNC: 2.3 UIU/ML (ref 0.27–4.2)
WBC # BLD AUTO: 7.1 K/UL (ref 4–11)

## 2024-09-25 ENCOUNTER — OFFICE VISIT (OUTPATIENT)
Age: 34
End: 2024-09-25
Payer: COMMERCIAL

## 2024-09-25 VITALS
OXYGEN SATURATION: 97 % | BODY MASS INDEX: 49.09 KG/M2 | DIASTOLIC BLOOD PRESSURE: 68 MMHG | SYSTOLIC BLOOD PRESSURE: 118 MMHG | WEIGHT: 286 LBS | RESPIRATION RATE: 20 BRPM | HEART RATE: 86 BPM

## 2024-09-25 DIAGNOSIS — E66.01 MORBID OBESITY DUE TO EXCESS CALORIES: ICD-10-CM

## 2024-09-25 DIAGNOSIS — G56.03 BILATERAL CARPAL TUNNEL SYNDROME: Primary | ICD-10-CM

## 2024-09-25 PROCEDURE — G2211 COMPLEX E/M VISIT ADD ON: HCPCS

## 2024-09-25 PROCEDURE — 99214 OFFICE O/P EST MOD 30 MIN: CPT

## 2024-09-25 RX ORDER — NAPROXEN 250 MG/1
500 TABLET ORAL 2 TIMES DAILY WITH MEALS
Qty: 56 TABLET | Refills: 0 | Status: SHIPPED | OUTPATIENT
Start: 2024-09-25 | End: 2024-10-24

## 2024-09-25 RX ORDER — CYCLOBENZAPRINE HCL 5 MG
5 TABLET ORAL NIGHTLY PRN
Qty: 14 TABLET | Refills: 0 | Status: SHIPPED | OUTPATIENT
Start: 2024-09-25 | End: 2024-10-09

## 2024-10-10 ENCOUNTER — INITIAL CONSULT (OUTPATIENT)
Dept: CARDIOLOGY CLINIC | Age: 34
End: 2024-10-10

## 2024-10-10 VITALS
HEART RATE: 65 BPM | HEIGHT: 64 IN | DIASTOLIC BLOOD PRESSURE: 74 MMHG | BODY MASS INDEX: 48.93 KG/M2 | WEIGHT: 286.6 LBS | SYSTOLIC BLOOD PRESSURE: 120 MMHG

## 2024-10-10 DIAGNOSIS — R55 SYNCOPE AND COLLAPSE: ICD-10-CM

## 2024-10-10 DIAGNOSIS — R55 SYNCOPE, UNSPECIFIED SYNCOPE TYPE: ICD-10-CM

## 2024-10-10 DIAGNOSIS — R00.2 PALPITATIONS: ICD-10-CM

## 2024-10-10 DIAGNOSIS — R42 DIZZINESS: Primary | ICD-10-CM

## 2024-10-10 DIAGNOSIS — R07.9 CHEST PAIN, UNSPECIFIED TYPE: ICD-10-CM

## 2024-10-10 DIAGNOSIS — R00.2 PALPITATION: ICD-10-CM

## 2024-10-10 DIAGNOSIS — I49.1 PAC (PREMATURE ATRIAL CONTRACTION): ICD-10-CM

## 2024-10-10 DIAGNOSIS — R00.1 BRADYCARDIA: ICD-10-CM

## 2024-10-10 RX ORDER — CYCLOBENZAPRINE HCL 5 MG
5 TABLET ORAL 3 TIMES DAILY PRN
COMMUNITY

## 2024-10-10 NOTE — PROGRESS NOTES
Value Date    LABA1C 4.9 03/23/2021     Lab Results   Component Value Date    WBC 7.1 09/11/2024    HGB 12.9 09/11/2024    HCT 38.3 09/11/2024     09/11/2024     All labs, medications and tests reviewed by myself including data and history from outside source , patient and available family .  Assessment & Plan:      1. Dizziness    2. PAC (premature atrial contraction)    3. Palpitation    4. Bradycardia    5. Syncope and collapse    6. Chest pain, unspecified type    7. Syncope, unspecified syncope type    8. Palpitations         Dizziness  Chronically low blood pressure with 90s?  Will get echo treadmill stress test to see blood pressure response to exercise also get carotids, also get a 48-hour Holter monitor.  Check for orthostatics.  If all fails we will try midodrine and Florinef to see if it helps?     Dyslipidemia :  All available lab work was reviewed.  Patient was advised to repeat lab work before next visit. Necessary orders were placed , instructions given by myself       Counseled extensively and medication compliance urged.  We discussed that for the  prevention of ASCVD our  goal is aggressive risk modification.Patient is encouraged to exercise if they can , educated about  brisk walk for 30 minutes  at least 3 to 4 times a week if there are no physical limitations  Various goals were discussed and questions answered. Continue current medications. Appropriate prescriptions are addressed and refills ordered.  Questions answered and patient verbalizes understanding.  Call for any problems, questions, or concerns.Greater than 60 % of time spent counseling besides reviewing data and images     Continue all other medications of all above medical condition listed as is.    Return in about 2 months (around 12/10/2024).    The above note is prepared with the intention to serve as communication with trained medical care practitioners only. Some of the information is provided by secondary sources as well

## 2024-10-10 NOTE — ASSESSMENT & PLAN NOTE
Chronically low blood pressure with 90s?  Will get echo treadmill stress test to see blood pressure response to exercise also get carotids, also get a 48-hour Holter monitor.  Check for orthostatics.  If all fails we will try midodrine and Florinef to see if it helps?

## 2024-10-24 ENCOUNTER — OFFICE VISIT (OUTPATIENT)
Age: 34
End: 2024-10-24

## 2024-10-24 VITALS
HEART RATE: 96 BPM | DIASTOLIC BLOOD PRESSURE: 82 MMHG | BODY MASS INDEX: 49.09 KG/M2 | SYSTOLIC BLOOD PRESSURE: 118 MMHG | WEIGHT: 286 LBS | OXYGEN SATURATION: 98 % | RESPIRATION RATE: 20 BRPM

## 2024-10-24 DIAGNOSIS — E66.01 MORBID OBESITY DUE TO EXCESS CALORIES: ICD-10-CM

## 2024-10-24 DIAGNOSIS — Z23 NEED FOR DIPHTHERIA-TETANUS-PERTUSSIS (TDAP) VACCINE: ICD-10-CM

## 2024-10-24 DIAGNOSIS — H00.015 HORDEOLUM EXTERNUM OF LEFT LOWER EYELID: Primary | ICD-10-CM

## 2024-10-24 ASSESSMENT — PATIENT HEALTH QUESTIONNAIRE - PHQ9
SUM OF ALL RESPONSES TO PHQ9 QUESTIONS 1 & 2: 0
SUM OF ALL RESPONSES TO PHQ QUESTIONS 1-9: 0
1. LITTLE INTEREST OR PLEASURE IN DOING THINGS: NOT AT ALL
SUM OF ALL RESPONSES TO PHQ QUESTIONS 1-9: 0
2. FEELING DOWN, DEPRESSED OR HOPELESS: NOT AT ALL
SUM OF ALL RESPONSES TO PHQ QUESTIONS 1-9: 0
SUM OF ALL RESPONSES TO PHQ QUESTIONS 1-9: 0

## 2024-10-24 NOTE — PROGRESS NOTES
[facility]  [address]  [facility] [address]  Dept: [phone]  Dept Fax: [phone]  Loc: [phone]      Visit type: Established patient    Encounter Start Time: 4:38 PM EDT  Encounter End Time: 5:03 PM EDT     Reason for Visit: Follow-up (4wk follow up weight pt. Feels gel is not working. Pt. States she has bubbles or bumps on the clear parts of her eyeball and pressure under the eye. Pt. Also stepped on a old hot wheel and it did break skin and pt. Is concerned with if she needs a tetanus shot.pt. has pain on top of foot. )      Assessment and Plan         Assessment & Plan  Stye   Uncontrolled  - The patient reports feeling like there was something in their eye since yesterday, with irritation and tearing but no vision loss or drainage. Examination revealed a stye.  - Warm compresses were recommended, to be applied for 15 minutes four times daily.  - If the condition worsens by tomorrow or does not improve by Monday, they should inform the clinic.  - In case of vision loss, an immediate emergency room visit is advised.    Foot injury   Uncontrolled  - The patient stepped on a toy, causing their foot to bleed.  - They were advised to get a tetanus shot since their last one was around the time of their child's birth.    3. Weight management   Uncontrolled  - The patient has been drinking at least three 20-ounce bottles of water daily and noticed their clothes fitting looser, although the scale does not reflect weight loss.  - They were advised to avoid high fructose corn syrup and refrain from consuming carbohydrates two hours before bedtime.  - Initiation of weight loss medication is planned for January 2024, provided baseline health metrics are established.    Orders Placed This Encounter   Procedures    Tdap, BOOSTRIX, (age 10 yrs+), IM         Patient was involved and agreeable in shared decision making.  Information about different treatment options

## 2024-10-24 NOTE — ASSESSMENT & PLAN NOTE
-Over all: -Improving  Wt Readings from Last 2 Encounters:   10/24/24 129.7 kg (286 lb)   10/10/24 130 kg (286 lb 9.6 oz)    -Change in energy: Improved   -Original Clothing Size: Shirt XXXL, Pant 22/24    -Feels looser   -Current Clothing Size: NA  -Action phase of change  -Next lifestyle goal  -Avoid high fructose corn syrup  -Reassess in 1 month  -Future goals  -No carbohydrates 2 hours before bedtime  -5 meals a day without exceeding daily calorie limit of 2500  -Minimal of 1500 calories a day  -6 to 8 hours of sleep nightly  -Incorporation of resistance training  -Will start with 3 days of exercise for 30 minutes  -Goals in maintenance:   -At least 64 ounces of water daily

## 2024-10-24 NOTE — PATIENT INSTRUCTIONS
We are committed to providing you the best care possible.    If you receive a survey after visiting one of our offices, please take time to share your experience concerning your physician office visit.  These surveys are confidential and no health information about you is shared.    We are eager to improve for you and continue to give you satisfactory care, we are counting on your feedback to help make that happen.            Welcome to [facility]:    Did you know we now have a faster way for you to move through your appointment? For your convenience, we now have digital registration available. When you schedule your next appointment, you will receive a link via your email as well as a text message that will allow you to complete any paperwork digitally before your appointment.

## 2024-10-31 ENCOUNTER — TELEPHONE (OUTPATIENT)
Dept: CARDIOLOGY CLINIC | Age: 34
End: 2024-10-31

## 2024-10-31 NOTE — TELEPHONE ENCOUNTER
Tests preformed 10/30, OV 12/19/24    Notified    Vascular US Duplex Carotid Bilateral       No stenosis in the bilateral internal carotid arteries.    Normal antegrade flow involving the bilateral vertebral arteries.    Echo (TTE) complete         Left Ventricle: Normal left ventricular systolic function with a visually estimated EF of 55 - 60%. Left ventricle size is normal. Normal wall thickness. Normal wall motion. Normal diastolic function.    Tricuspid Valve: Normal RVSP. The estimated RVSP is 14 mmHg.    Left Atrium: Left atrium is mildly dilated.    Pericardium: No pericardial effusion.    Image quality is fair.

## 2024-11-26 ENCOUNTER — TELEPHONE (OUTPATIENT)
Dept: CARDIOLOGY CLINIC | Age: 34
End: 2024-11-26

## 2024-12-05 ENCOUNTER — OFFICE VISIT (OUTPATIENT)
Age: 34
End: 2024-12-05
Payer: COMMERCIAL

## 2024-12-05 VITALS
OXYGEN SATURATION: 99 % | RESPIRATION RATE: 20 BRPM | WEIGHT: 282.2 LBS | BODY MASS INDEX: 48.44 KG/M2 | SYSTOLIC BLOOD PRESSURE: 118 MMHG | HEART RATE: 83 BPM | DIASTOLIC BLOOD PRESSURE: 78 MMHG

## 2024-12-05 DIAGNOSIS — J02.9 ACUTE PHARYNGITIS, UNSPECIFIED ETIOLOGY: Primary | ICD-10-CM

## 2024-12-05 DIAGNOSIS — R05.1 ACUTE COUGH: ICD-10-CM

## 2024-12-05 DIAGNOSIS — E66.01 MORBID OBESITY DUE TO EXCESS CALORIES: ICD-10-CM

## 2024-12-05 PROCEDURE — 99213 OFFICE O/P EST LOW 20 MIN: CPT

## 2024-12-05 PROCEDURE — G2211 COMPLEX E/M VISIT ADD ON: HCPCS

## 2024-12-05 RX ORDER — LEVALBUTEROL TARTRATE 45 UG/1
1-2 AEROSOL, METERED ORAL EVERY 4 HOURS PRN
Qty: 15 G | Refills: 5 | Status: SHIPPED | OUTPATIENT
Start: 2024-12-05

## 2024-12-05 NOTE — PROGRESS NOTES
[facility]  [address]  [facility] [address]  Dept: [phone]  Dept Fax: [phone]  Loc: [phone]      Visit type: Established patient    Encounter Start Time: 4:36 PM EST  Encounter End Time: 4:52 PM EST     Reason for Visit: Weight Management (1mth follow up weight management pt. Is hoarse, coughing up yellow to brown phlegm. Pt. Has sore throat worse in the morning. Pt. Denies fever, chills, and body ache. )      Assessment and Plan         Assessment & Plan  Weight management  -Over all: -Uncontrolled, Improving  Wt Readings from Last 2 Encounters:   12/05/24 128 kg (282 lb 3.2 oz)   10/30/24 129.7 kg (286 lb)    -Change in energy: Improved   -Original Clothing Size: Shirt XXXL, Pant 22/24    -Feels looser   -Current Clothing Size: NA  -Action phase of change  -Next lifestyle goal   -No carbohydrates 2 hours before bedtime  -Reassess in 1 month  -Future goals  -5 meals a day without exceeding daily calorie limit of 2500  -Minimal of 1500 calories a day  -6 to 8 hours of sleep nightly  -Incorporation of resistance training  -Will start with 3 days of exercise for 30 minutes  -Goals in maintenance:   -At least 64 ounces of water daily   -Avoid high fructose corn syrup    Upper respiratory infection  Uncontrolled, Improving  The hemoptysis could be attributed to irritation. There is no evidence of an ear infection, and pneumonia is not a concern.  Diagnostic plan: -  Treatment plan: - An antibiotic prescription was provided, but she was instructed to only collect it if her condition does not improve.  - Mucinex was recommended for daytime use and Robitussin for nighttime use.  - She was advised to alternate between Tylenol and ibuprofen every four hours.  - An albuterol inhaler was prescribed.  - The use of Flonase was increased to two sprays in each nostril while the postnasal drip persists, and then reduced to once daily once the drip ceases.  - If her

## 2024-12-05 NOTE — ASSESSMENT & PLAN NOTE
-Over all: -Improving  Wt Readings from Last 2 Encounters:   12/05/24 128 kg (282 lb 3.2 oz)   10/30/24 129.7 kg (286 lb)    -Change in energy: Improved   -Original Clothing Size: Shirt XXXL, Pant 22/24    -Feels looser   -Current Clothing Size: NA  -Action phase of change  -Next lifestyle goal   -No carbohydrates 2 hours before bedtime  -Reassess in 1 month  -Future goals  -5 meals a day without exceeding daily calorie limit of 2500  -Minimal of 1500 calories a day  -6 to 8 hours of sleep nightly  -Incorporation of resistance training  -Will start with 3 days of exercise for 30 minutes  -Goals in maintenance:   -At least 64 ounces of water daily   -Avoid high fructose corn syrup

## 2024-12-10 RX ORDER — AZITHROMYCIN 250 MG/1
TABLET, FILM COATED ORAL
Qty: 6 TABLET | Refills: 0 | Status: SHIPPED | OUTPATIENT
Start: 2024-12-10 | End: 2024-12-20

## 2024-12-30 PROBLEM — R42 DIZZINESS: Chronic | Status: ACTIVE | Noted: 2024-10-10

## 2025-01-28 ASSESSMENT — ENCOUNTER SYMPTOMS
COUGH: 0
NAUSEA: 0
ABDOMINAL PAIN: 0
RHINORRHEA: 0
SHORTNESS OF BREATH: 0
VOMITING: 0
SORE THROAT: 0

## 2025-02-24 PROBLEM — G56.03 BILATERAL CARPAL TUNNEL SYNDROME: Status: ACTIVE | Noted: 2025-02-24

## 2025-02-24 ASSESSMENT — ENCOUNTER SYMPTOMS
NAUSEA: 0
VOMITING: 0
SORE THROAT: 0
ABDOMINAL PAIN: 0
RHINORRHEA: 0
COUGH: 0
SHORTNESS OF BREATH: 0

## 2025-02-24 NOTE — ASSESSMENT & PLAN NOTE
-Uncontrolled  -Wrist splints to be worn nightly   -Do not strap on too tightly  -Naproxen 500 mg twice daily for 14 days scheduled  -Voltaren gel cream up to 4 times a day as needed  -Flexeril 5 mg nightly as needed for 14 days

## 2025-02-24 NOTE — ASSESSMENT & PLAN NOTE
-Uncontrolled  Change in weight: NA  Subjective Energy Level: NA  Original clothing size: Shirt XXL, Pants: mens 50, 22 women  Change in clothing size: NA  Stage of change: Pre-Contemplation  -Current lifestyle goal  -Avoid high fructose corn syrup  -Reassess in 1 month  -Future goals  -No carbohydrates 2 hours before bedtime  -5 meals a day without exceeding daily calorie limit of 2500  -Minimum 1500 calories a day  -At least 64 ounces of water daily  -6 to 8 hours of sleep nightly  -Incorporation of resistance training  -Will start with 3 days of exercise for 30 minutes  -Goals in maintenance:   -None currently

## 2025-03-24 ENCOUNTER — APPOINTMENT (OUTPATIENT)
Dept: CT IMAGING | Age: 35
End: 2025-03-24
Payer: COMMERCIAL

## 2025-03-24 ENCOUNTER — OFFICE VISIT (OUTPATIENT)
Age: 35
End: 2025-03-24
Payer: COMMERCIAL

## 2025-03-24 ENCOUNTER — HOSPITAL ENCOUNTER (EMERGENCY)
Age: 35
Discharge: HOME | End: 2025-03-24
Attending: EMERGENCY MEDICINE
Payer: COMMERCIAL

## 2025-03-24 VITALS
RESPIRATION RATE: 24 BRPM | DIASTOLIC BLOOD PRESSURE: 66 MMHG | SYSTOLIC BLOOD PRESSURE: 90 MMHG | BODY MASS INDEX: 48.92 KG/M2 | OXYGEN SATURATION: 99 % | HEART RATE: 89 BPM | WEIGHT: 285 LBS

## 2025-03-24 VITALS
BODY MASS INDEX: 48.55 KG/M2 | DIASTOLIC BLOOD PRESSURE: 60 MMHG | OXYGEN SATURATION: 100 % | WEIGHT: 284.4 LBS | HEART RATE: 82 BPM | RESPIRATION RATE: 18 BRPM | TEMPERATURE: 98 F | HEIGHT: 64 IN | SYSTOLIC BLOOD PRESSURE: 108 MMHG

## 2025-03-24 DIAGNOSIS — R10.12 LEFT UPPER QUADRANT ABDOMINAL PAIN: Primary | ICD-10-CM

## 2025-03-24 DIAGNOSIS — R23.2 FLUSHING: ICD-10-CM

## 2025-03-24 DIAGNOSIS — E86.0 DEHYDRATION: ICD-10-CM

## 2025-03-24 LAB
ALBUMIN SERPL-MCNC: 4.1 G/DL (ref 3.4–5)
ALBUMIN/GLOB SERPL: 1.5 {RATIO}
ALP SERPL-CCNC: 73 U/L (ref 40–129)
ALT SERPL-CCNC: 14 U/L (ref 10–40)
ANION GAP SERPL CALCULATED.3IONS-SCNC: 14 MMOL/L (ref 9–17)
AST SERPL-CCNC: 19 U/L (ref 15–37)
B-HCG SERPL EIA 3RD IS-ACNC: <1 MIU/ML
BASOPHILS # BLD: 0.04 K/UL
BASOPHILS NFR BLD: 0 % (ref 0–1)
BILIRUB SERPL-MCNC: 0.4 MG/DL (ref 0–1)
BILIRUB UR QL STRIP: NEGATIVE
BUN SERPL-MCNC: 11 MG/DL (ref 7–20)
CALCIUM SERPL-MCNC: 8.5 MG/DL (ref 8.3–10.6)
CHLORIDE SERPL-SCNC: 103 MMOL/L (ref 99–110)
CLARITY UR: CLEAR
CO2 SERPL-SCNC: 21 MMOL/L (ref 21–32)
COLOR UR: YELLOW
COMMENT: NORMAL
CREAT SERPL-MCNC: 0.7 MG/DL (ref 0.6–1.1)
EOSINOPHIL # BLD: 0.14 K/UL
EOSINOPHILS RELATIVE PERCENT: 2 % (ref 0–3)
ERYTHROCYTE [DISTWIDTH] IN BLOOD BY AUTOMATED COUNT: 13.2 % (ref 11.7–14.9)
GFR, ESTIMATED: >90 ML/MIN/1.73M2
GLUCOSE SERPL-MCNC: 90 MG/DL (ref 74–99)
GLUCOSE UR STRIP-MCNC: NEGATIVE MG/DL
HCT VFR BLD AUTO: 41.6 % (ref 37–47)
HGB BLD-MCNC: 13.6 G/DL (ref 12.5–16)
HGB UR QL STRIP.AUTO: NEGATIVE
IMM GRANULOCYTES # BLD AUTO: 0.03 K/UL
IMM GRANULOCYTES NFR BLD: 0 %
KETONES UR STRIP-MCNC: NEGATIVE MG/DL
LEUKOCYTE ESTERASE UR QL STRIP: NEGATIVE
LIPASE SERPL-CCNC: 19 U/L (ref 13–60)
LYMPHOCYTES NFR BLD: 2.47 K/UL
LYMPHOCYTES RELATIVE PERCENT: 28 % (ref 24–44)
MCH RBC QN AUTO: 27.5 PG (ref 27–31)
MCHC RBC AUTO-ENTMCNC: 32.7 G/DL (ref 32–36)
MCV RBC AUTO: 84 FL (ref 78–100)
MONOCYTES NFR BLD: 0.52 K/UL
MONOCYTES NFR BLD: 6 % (ref 0–4)
NEUTROPHILS NFR BLD: 64 % (ref 36–66)
NEUTS SEG NFR BLD: 5.69 K/UL
NITRITE UR QL STRIP: NEGATIVE
PH UR STRIP: 6 [PH] (ref 5–8)
PLATELET # BLD AUTO: 248 K/UL (ref 140–440)
PMV BLD AUTO: 9.8 FL (ref 7.5–11.1)
POTASSIUM SERPL-SCNC: 4.1 MMOL/L (ref 3.5–5.1)
PROT SERPL-MCNC: 6.8 G/DL (ref 6.4–8.2)
PROT UR STRIP-MCNC: NEGATIVE MG/DL
RBC # BLD AUTO: 4.95 M/UL (ref 4.2–5.4)
SODIUM SERPL-SCNC: 138 MMOL/L (ref 136–145)
SP GR UR STRIP: 1.01 (ref 1–1.03)
UROBILINOGEN UR STRIP-ACNC: 0.2 EU/DL (ref 0–1)
WBC OTHER # BLD: 8.9 K/UL (ref 4–10.5)

## 2025-03-24 PROCEDURE — 84702 CHORIONIC GONADOTROPIN TEST: CPT

## 2025-03-24 PROCEDURE — 96361 HYDRATE IV INFUSION ADD-ON: CPT

## 2025-03-24 PROCEDURE — 6360000004 HC RX CONTRAST MEDICATION: Performed by: EMERGENCY MEDICINE

## 2025-03-24 PROCEDURE — 6360000002 HC RX W HCPCS: Performed by: EMERGENCY MEDICINE

## 2025-03-24 PROCEDURE — 2580000003 HC RX 258: Performed by: EMERGENCY MEDICINE

## 2025-03-24 PROCEDURE — 99285 EMERGENCY DEPT VISIT HI MDM: CPT

## 2025-03-24 PROCEDURE — 83690 ASSAY OF LIPASE: CPT

## 2025-03-24 PROCEDURE — 81003 URINALYSIS AUTO W/O SCOPE: CPT

## 2025-03-24 PROCEDURE — 80053 COMPREHEN METABOLIC PANEL: CPT

## 2025-03-24 PROCEDURE — 96374 THER/PROPH/DIAG INJ IV PUSH: CPT

## 2025-03-24 PROCEDURE — 74177 CT ABD & PELVIS W/CONTRAST: CPT

## 2025-03-24 PROCEDURE — 85025 COMPLETE CBC W/AUTO DIFF WBC: CPT

## 2025-03-24 PROCEDURE — 99215 OFFICE O/P EST HI 40 MIN: CPT

## 2025-03-24 RX ORDER — PANTOPRAZOLE SODIUM 40 MG/1
40 TABLET, DELAYED RELEASE ORAL
Qty: 30 TABLET | Refills: 0 | Status: SHIPPED | OUTPATIENT
Start: 2025-03-24

## 2025-03-24 RX ORDER — ONDANSETRON 4 MG/1
4 TABLET, ORALLY DISINTEGRATING ORAL 3 TIMES DAILY PRN
Qty: 21 TABLET | Refills: 0 | Status: SHIPPED | OUTPATIENT
Start: 2025-03-24

## 2025-03-24 RX ORDER — METOCLOPRAMIDE HYDROCHLORIDE 5 MG/ML
10 INJECTION INTRAMUSCULAR; INTRAVENOUS ONCE
Status: COMPLETED | OUTPATIENT
Start: 2025-03-24 | End: 2025-03-24

## 2025-03-24 RX ORDER — SODIUM CHLORIDE, SODIUM LACTATE, POTASSIUM CHLORIDE, CALCIUM CHLORIDE 600; 310; 30; 20 MG/100ML; MG/100ML; MG/100ML; MG/100ML
1000 INJECTION, SOLUTION INTRAVENOUS ONCE
Status: COMPLETED | OUTPATIENT
Start: 2025-03-24 | End: 2025-03-24

## 2025-03-24 RX ORDER — IOPAMIDOL 755 MG/ML
75 INJECTION, SOLUTION INTRAVASCULAR
Status: COMPLETED | OUTPATIENT
Start: 2025-03-24 | End: 2025-03-24

## 2025-03-24 RX ADMIN — IOPAMIDOL 75 ML: 755 INJECTION, SOLUTION INTRAVENOUS at 19:03

## 2025-03-24 RX ADMIN — SODIUM CHLORIDE, POTASSIUM CHLORIDE, SODIUM LACTATE AND CALCIUM CHLORIDE 1000 ML: 600; 310; 30; 20 INJECTION, SOLUTION INTRAVENOUS at 17:05

## 2025-03-24 RX ADMIN — METOCLOPRAMIDE 10 MG: 5 INJECTION, SOLUTION INTRAMUSCULAR; INTRAVENOUS at 17:05

## 2025-03-24 SDOH — ECONOMIC STABILITY: FOOD INSECURITY: WITHIN THE PAST 12 MONTHS, YOU WORRIED THAT YOUR FOOD WOULD RUN OUT BEFORE YOU GOT MONEY TO BUY MORE.: PATIENT DECLINED

## 2025-03-24 SDOH — ECONOMIC STABILITY: FOOD INSECURITY: WITHIN THE PAST 12 MONTHS, THE FOOD YOU BOUGHT JUST DIDN'T LAST AND YOU DIDN'T HAVE MONEY TO GET MORE.: PATIENT DECLINED

## 2025-03-24 ASSESSMENT — ENCOUNTER SYMPTOMS
NAUSEA: 1
VOMITING: 1
ABDOMINAL PAIN: 1

## 2025-03-24 ASSESSMENT — PATIENT HEALTH QUESTIONNAIRE - PHQ9
SUM OF ALL RESPONSES TO PHQ QUESTIONS 1-9: 0
2. FEELING DOWN, DEPRESSED OR HOPELESS: NOT AT ALL
1. LITTLE INTEREST OR PLEASURE IN DOING THINGS: NOT AT ALL

## 2025-03-24 ASSESSMENT — PAIN - FUNCTIONAL ASSESSMENT
PAIN_FUNCTIONAL_ASSESSMENT: PREVENTS OR INTERFERES SOME ACTIVE ACTIVITIES AND ADLS
PAIN_FUNCTIONAL_ASSESSMENT: 0-10

## 2025-03-24 ASSESSMENT — PAIN DESCRIPTION - DESCRIPTORS: DESCRIPTORS: OTHER (COMMENT)

## 2025-03-24 ASSESSMENT — PAIN DESCRIPTION - PAIN TYPE: TYPE: ACUTE PAIN

## 2025-03-24 ASSESSMENT — PAIN DESCRIPTION - ONSET: ONSET: ON-GOING

## 2025-03-24 ASSESSMENT — PAIN SCALES - GENERAL: PAINLEVEL_OUTOF10: 3

## 2025-03-24 ASSESSMENT — PAIN DESCRIPTION - ORIENTATION: ORIENTATION: LEFT;UPPER

## 2025-03-24 ASSESSMENT — PAIN DESCRIPTION - LOCATION: LOCATION: ABDOMEN

## 2025-03-24 ASSESSMENT — LIFESTYLE VARIABLES
HOW MANY STANDARD DRINKS CONTAINING ALCOHOL DO YOU HAVE ON A TYPICAL DAY: PATIENT DOES NOT DRINK
HOW OFTEN DO YOU HAVE A DRINK CONTAINING ALCOHOL: NEVER

## 2025-03-24 ASSESSMENT — PAIN DESCRIPTION - FREQUENCY: FREQUENCY: CONTINUOUS

## 2025-03-24 NOTE — ED PROVIDER NOTES
Emergency Department Encounter    Patient: Elvia Jacques  MRN: [id number]  : 1990  Date of Evaluation: 3/24/2025  ED Provider:  Pearl Pierre MD    Briefly, Elvia Jacques is a 34 y.o. female presented to the emergency department with upper abdominal pain and early satiety.  She was seen by previous physician.  Please see that note for full HPI    I have reviewed and interpreted all of the currently available lab results from this visit (if applicable)  Results for orders placed or performed during the hospital encounter of 25   CBC with Auto Differential   Result Value Ref Range    WBC 8.9 4.0 - 10.5 k/uL    RBC 4.95 4.20 - 5.40 m/uL    Hemoglobin 13.6 12.5 - 16.0 g/dL    Hematocrit 41.6 37.0 - 47.0 %    MCV 84.0 78.0 - 100.0 fL    MCH 27.5 27.0 - 31.0 pg    MCHC 32.7 32.0 - 36.0 g/dL    RDW 13.2 11.7 - 14.9 %    Platelets 248 140 - 440 k/uL    MPV 9.8 7.5 - 11.1 fL    Neutrophils % 64 36 - 66 %    Lymphocytes % 28 24 - 44 %    Monocytes % 6 (H) 0 - 4 %    Eosinophils % 2 0 - 3 %    Basophils % 0 0 - 1 %    Immature Granulocytes % 0 0 %    Neutrophils Absolute 5.69 k/uL    Lymphocytes Absolute 2.47 k/uL    Monocytes Absolute 0.52 k/uL    Eosinophils Absolute 0.14 k/uL    Basophils Absolute 0.04 k/uL    Immature Granulocytes Absolute 0.03 k/uL   CMP   Result Value Ref Range    Sodium 138 136 - 145 mmol/L    Potassium 4.1 3.5 - 5.1 mmol/L    Chloride 103 99 - 110 mmol/L    CO2 21 21 - 32 mmol/L    Anion Gap 14 9 - 17 mmol/L    Glucose 90 74 - 99 mg/dL    BUN 11 7 - 20 mg/dL    Creatinine 0.7 0.6 - 1.1 mg/dL    Est, Glom Filt Rate >90 >60 mL/min/1.73m2    Calcium 8.5 8.3 - 10.6 mg/dL    Total Protein 6.8 6.4 - 8.2 g/dL    Albumin 4.1 3.4 - 5.0 g/dL    Albumin/Globulin Ratio 1.5     Total Bilirubin 0.4 0.0 - 1.0 mg/dL    Alkaline Phosphatase 73 40 - 129 U/L    ALT 14 10 - 40 U/L    AST 19 15 - 37 U/L   Lipase   Result Value Ref Range    Lipase 19 13 - 60 U/L   Urinalysis   Result Value Ref Range    
ABDOMEN PELVIS W IV CONTRAST Additional Contrast? None    (Results Pending)         EKG (if obtained): (All EKG's are interpreted by myself in the absence of a cardiologist)    Chart review shows recent radiographs:  No results found.    MDM:      Patient presents to the ED with early satiety upper abdominal pain.  CT is pending at time of dictation Case will be signed out to the oncoming night physician.        6:50 PM: I discussed the history, physical, and treatment plan with Dr. Lucian Pierre. Elvia DORANTES Georgie was signed out in stable condition. Please see Dr. EAST's note for further details, including diagnosis and disposition.    Neftali Lambert DO, FACEP             ED Course and Summary:     History from : Patient    Limitations to history : None    Patient was given the following medications:  Medications   lactated ringers infusion 1,000 mL (1,000 mLs IntraVENous New Bag 3/24/25 1705)   metoclopramide (REGLAN) injection 10 mg (10 mg IntraVENous Given 3/24/25 1705)       Imaging Interpretation by CT PENDING      Chronic conditions affecting care: noted     Discussion with Other Profesionals : None    Social Determinants : None    Records Reviewed : Source EPIC       Disposition Considerations:  PENDING           I am the Primary Clinician of Record.           Clinical Impression:  1. Left upper quadrant abdominal pain      Disposition referral (if applicable):  Dale Flores MD  [address] [place] [id number] [phone]    Go in 1 day      Disposition medications (if applicable):  New Prescriptions    No medications on file           Neftali Lambert DO, FACEP      Comment: Please note this report has been produced using speech recognition software and maycontain errors related to that system including errors in grammar, punctuation, and spelling, as well as words and phrases that may be inappropriate. If there are any questions or concerns please feel free to contact thedictating provider for

## 2025-03-24 NOTE — DISCHARGE INSTRUCTIONS
Your labs and CT scan today were overall nonacute.    Please follow-up with gastroenterology.    If you develop any worsening or concerning symptoms, please seek immediate medical evaluation

## 2025-03-24 NOTE — PROGRESS NOTES
tests, she should contact the office immediately.      Lightheadedness  Uncontrolled, No Change  - Reports feeling lightheaded throughout the week, especially when getting up.  - Likely related to dehydration and low blood pressure.  - Immediate IV fluids are recommended to address these symptoms.  - Advised to go to the emergency room for hydration and further evaluation.      No orders of the defined types were placed in this encounter.    No orders of the defined types were placed in this encounter.        Patient was involved and agreeable in shared decision making.  Information about different treatment options including side effects discussed with patient.  Any information requested was supplied.    Return if symptoms worsen or fail to improve.      Subjective     Elvia Jacques is a 34 y.o. female is here for:   Chief Complaint   Patient presents with    Other     Stomach pain x2wk nausea no vomiting left side upper quadrant pt. States she is hungry but can not eat because she feels physically full. Pt states she is really hungry but ate 5 bites of food and is full. Pt. States when she drinks even a little water makes her feels full and feels like its sitting right at that painful spot. Pt. Has been light headed all day.         History of Present Illness  The patient is a 34-year-old female who presents for follow-up.    She has been experiencing persistent satiety for the past 2 weeks, which has led to a significant reduction in her food intake. Despite this, she continues to experience hunger pains. Symptoms began last night with an unusual feeling of fullness after consuming half of her meal. Intermittent lightheadedness has been reported throughout the day. The sensation of fullness is described as similar to post-Thanksgiving meal satiety, accompanied by hunger pains. Nausea occurs upon eating, and there have been episodes of regurgitation, described as acidic in taste. No treatment has been sought for

## 2025-03-24 NOTE — ED NOTES
Patient state she just went to the restroom before coming in, cannot go at this time, will try again shortly.

## 2025-03-26 ENCOUNTER — OFFICE VISIT (OUTPATIENT)
Age: 35
End: 2025-03-26
Payer: COMMERCIAL

## 2025-03-26 VITALS
SYSTOLIC BLOOD PRESSURE: 128 MMHG | BODY MASS INDEX: 48.13 KG/M2 | RESPIRATION RATE: 18 BRPM | OXYGEN SATURATION: 99 % | WEIGHT: 280.4 LBS | DIASTOLIC BLOOD PRESSURE: 74 MMHG | HEART RATE: 88 BPM

## 2025-03-26 DIAGNOSIS — R10.12 LEFT UPPER QUADRANT ABDOMINAL PAIN: Primary | ICD-10-CM

## 2025-03-26 DIAGNOSIS — R51.9 ACUTE NONINTRACTABLE HEADACHE, UNSPECIFIED HEADACHE TYPE: ICD-10-CM

## 2025-03-26 PROCEDURE — 99213 OFFICE O/P EST LOW 20 MIN: CPT

## 2025-03-26 NOTE — PROGRESS NOTES
[facility]  [address]  [facility] [address]  Dept: [phone]  Dept Fax: [phone]  Loc: [phone]      Visit type: Established patient    Encounter Start Time: 1:42 PM EDT  Encounter End Time: 2:09 AM EDT     100% of the time was spent with the patient today, discussing their symptoms, conducting an examination, reviewing the patient's diagnostic test results, and counseling    Reason for Visit: Abdominal Pain (Stomach no longer has a stabbing pain but is now achy. Pt states eating causes pain and she feels full and bloated.) and Headache (Has had a headache X2 days. States she afraid to take ibuprofen with fears it will hurt stomach.)      Assessment and Plan         Assessment & Plan  Abdominal pain  Improving  - Her condition has shown improvement since the last visit, with better communication and emotional state.  - The etiology of the pain could be viral, leading to gastric inflammation.  - She is advised to maintain hydration and nutrition.  - A regimen of Zofran 4 mg orally disintegrating tablet 20 minutes prior to meals and pantoprazole 40 mg once daily before breakfast is recommended.  - She is instructed to start with clear liquids, progressing to non-clear liquids, and gradually increasing the consistency of her diet, incorporating milk and meat towards the end.  - A work excuse note has been provided via [employer].  - If her condition deteriorates, she is to inform immediately.    Headache  Uncontrolled  - She reports a persistent headache since yesterday.  - Blood pressure today was 128/74, and vitals are stable.  - She is advised to take Tylenol for headache relief, avoiding ibuprofen or NSAIDs due to potential gastric irritation.  - Caffeine may be considered for headache relief but should be used cautiously as it can also irritate the stomach.    No orders of the defined types were placed in this encounter.    No orders of the defined types were

## 2025-04-01 ENCOUNTER — OFFICE VISIT (OUTPATIENT)
Age: 35
End: 2025-04-01
Payer: COMMERCIAL

## 2025-04-01 VITALS
WEIGHT: 281.6 LBS | SYSTOLIC BLOOD PRESSURE: 128 MMHG | RESPIRATION RATE: 18 BRPM | BODY MASS INDEX: 48.34 KG/M2 | DIASTOLIC BLOOD PRESSURE: 72 MMHG | OXYGEN SATURATION: 98 % | HEART RATE: 65 BPM

## 2025-04-01 DIAGNOSIS — R19.7 DIARRHEA OF PRESUMED INFECTIOUS ORIGIN: Primary | ICD-10-CM

## 2025-04-01 DIAGNOSIS — R19.7 DIARRHEA, UNSPECIFIED TYPE: Primary | ICD-10-CM

## 2025-04-01 PROCEDURE — 99213 OFFICE O/P EST LOW 20 MIN: CPT

## 2025-04-01 RX ORDER — DICYCLOMINE HYDROCHLORIDE 10 MG/1
10 CAPSULE ORAL
Qty: 120 CAPSULE | Refills: 0 | Status: SHIPPED | OUTPATIENT
Start: 2025-04-01

## 2025-04-01 NOTE — PROGRESS NOTES
[facility]  [address]  [facility] [address]  Dept: [phone]  Dept Fax: [phone]  Loc: [phone]      Visit type: Established patient    Encounter Start Time: 10:29 AM EDT  Encounter End Time: 10:44 AM EDT     100% of the time was spent with the patient today, discussing their symptoms, conducting an examination, reviewing the patient's diagnostic test results, and counseling    Reason for Visit: Diarrhea (Started last night. Watery. Painful. Had cramping. ), Nausea, Sweats (Having cold/hot chills), Dizziness, and Abdominal Pain      Assessment and Plan         Assessment & Plan  Explosive diarrhea  Uncontrolled  Symptoms likely due to the sloughing off of the stomach lining, causing diarrhea.    TREATMENT:  - Advised to consume bland foods and gradually reintroduce seasonings into the diet.  - Prescription for dicyclomine 10 mg provided to manage diarrhea; informed that it may cause drowsiness, so caution is advised when driving or operating machinery.  - Advised to try Pepto-Bismol again; instructed to take today and tomorrow off from work and reassess condition thereafter.    FUTURE PLANS:  - If condition worsens or previous symptoms such as vomiting and nausea return, further intervention will be necessary.     No orders of the defined types were placed in this encounter.    Orders Placed This Encounter   Medications    dicyclomine (BENTYL) 10 MG capsule     Sig: Take 1 capsule by mouth 4 times daily (before meals and nightly)     Dispense:  120 capsule     Refill:  0         Patient was involved and agreeable in shared decision making.  Information about different treatment options including side effects discussed with patient.  Any information requested was supplied.    Return in about 4 weeks (around 4/29/2025) for abd pain.      Subjective     Elvia Jacques is a 34 y.o. female is here for:   Chief Complaint   Patient presents with    Diarrhea     Started

## 2025-04-02 ENCOUNTER — HOSPITAL ENCOUNTER (OUTPATIENT)
Age: 35
Discharge: HOME | End: 2025-04-02
Payer: COMMERCIAL

## 2025-04-02 DIAGNOSIS — R19.7 DIARRHEA OF PRESUMED INFECTIOUS ORIGIN: ICD-10-CM

## 2025-04-02 LAB
ADV 40+41 DNA STL QL NAA+NON-PROBE: NOT DETECTED
C CAYETANENSIS DNA STL QL NAA+NON-PROBE: NOT DETECTED
C COLI+JEJ+UPSA DNA STL QL NAA+NON-PROBE: NOT DETECTED
CRYPTOSP DNA STL QL NAA+NON-PROBE: NOT DETECTED
E COLI O157 DNA STL QL NAA+NON-PROBE: NOT DETECTED
E HISTOLYT DNA STL QL NAA+NON-PROBE: NOT DETECTED
EAEC PAA PLAS AGGR+AATA ST NAA+NON-PRB: NOT DETECTED
EC STX1+STX2 GENES STL QL NAA+NON-PROBE: NOT DETECTED
EPEC EAE GENE STL QL NAA+NON-PROBE: NOT DETECTED
ETEC LTA+ST1A+ST1B TOX ST NAA+NON-PROBE: NOT DETECTED
G LAMBLIA DNA STL QL NAA+NON-PROBE: NOT DETECTED
GI PATH DNA+RNA PNL STL NAA+NON-PROBE: NOT DETECTED
NOROVIRUS GI+II RNA STL QL NAA+NON-PROBE: NOT DETECTED
P SHIGELLOIDES DNA STL QL NAA+NON-PROBE: NOT DETECTED
RVA RNA STL QL NAA+NON-PROBE: NOT DETECTED
S ENT+BONG DNA STL QL NAA+NON-PROBE: NOT DETECTED
SAPO I+II+IV+V RNA STL QL NAA+NON-PROBE: NOT DETECTED
SOURCE: NORMAL
V CHOL+PARA+VUL DNA STL QL NAA+NON-PROBE: NOT DETECTED
V CHOLERAE DNA STL QL NAA+NON-PROBE: NOT DETECTED
Y ENTEROCOL DNA STL QL NAA+NON-PROBE: NOT DETECTED

## 2025-04-02 PROCEDURE — 87507 IADNA-DNA/RNA PROBE TQ 12-25: CPT

## 2025-04-03 ENCOUNTER — RESULTS FOLLOW-UP (OUTPATIENT)
Age: 35
End: 2025-04-03

## 2025-04-15 DIAGNOSIS — R19.7 DIARRHEA, UNSPECIFIED TYPE: ICD-10-CM

## 2025-04-16 RX ORDER — DICYCLOMINE HYDROCHLORIDE 10 MG/1
10 CAPSULE ORAL
Qty: 360 CAPSULE | Refills: 1 | OUTPATIENT
Start: 2025-04-16

## 2025-04-23 ENCOUNTER — OFFICE VISIT (OUTPATIENT)
Age: 35
End: 2025-04-23
Payer: COMMERCIAL

## 2025-04-23 VITALS
TEMPERATURE: 98.8 F | WEIGHT: 280 LBS | HEIGHT: 64 IN | DIASTOLIC BLOOD PRESSURE: 80 MMHG | SYSTOLIC BLOOD PRESSURE: 128 MMHG | BODY MASS INDEX: 47.8 KG/M2 | OXYGEN SATURATION: 98 % | HEART RATE: 78 BPM

## 2025-04-23 DIAGNOSIS — R68.89 FLU-LIKE SYMPTOMS: ICD-10-CM

## 2025-04-23 DIAGNOSIS — R09.89 SYMPTOMS OF UPPER RESPIRATORY INFECTION (URI): Primary | ICD-10-CM

## 2025-04-23 PROCEDURE — 87428 SARSCOV & INF VIR A&B AG IA: CPT | Performed by: PHYSICIAN ASSISTANT

## 2025-04-23 PROCEDURE — 99213 OFFICE O/P EST LOW 20 MIN: CPT | Performed by: PHYSICIAN ASSISTANT

## 2025-04-23 PROCEDURE — 87880 STREP A ASSAY W/OPTIC: CPT | Performed by: PHYSICIAN ASSISTANT

## 2025-04-23 RX ORDER — FLUTICASONE PROPIONATE 50 MCG
2 SPRAY, SUSPENSION (ML) NASAL DAILY
Qty: 16 G | Refills: 0 | Status: SHIPPED | OUTPATIENT
Start: 2025-04-23

## 2025-04-23 ASSESSMENT — ENCOUNTER SYMPTOMS
WHEEZING: 0
SORE THROAT: 1
CONSTIPATION: 0
DIARRHEA: 0
BACK PAIN: 0
SHORTNESS OF BREATH: 0
EYE DISCHARGE: 0
COUGH: 1
ABDOMINAL PAIN: 0
EYE REDNESS: 0
BLOOD IN STOOL: 0
EYE PAIN: 0
PHOTOPHOBIA: 0
NAUSEA: 0
CHEST TIGHTNESS: 0
RHINORRHEA: 1
VOMITING: 0
COLOR CHANGE: 0

## 2025-04-23 NOTE — PROGRESS NOTES
Elvia Jacques (:  1990) is a 34 y.o. female,Established patient, here for evaluation of the following chief complaint(s):    Cough (Started yesterday), Pharyngitis, Fever (100. something), Fatigue (Started monday), Nasal Congestion, Chest Congestion, and Generalized Body Aches (Arms and neck)      ASSESSMENT/PLAN:  Assessment & Plan  1. Presumed Viral illness.  - Symptoms: sore throat, fatigue, nasal congestion, rhinorrhea, low-grade fever (<101°F), body aches.  - Physical exam: red throat with drainage, normal lung sounds, normal heart rate and rhythm, tender lymph nodes, no significant tonsillar swelling or exudates.  - Negative tests: strep, COVID-19, influenza A and B. Throat culture swab ordered. Monitor temperature and symptoms.  - Prescriptions: Flonase (2 sprays each nostril daily after nasal irrigation), Capmist, continue daily Zyrtec. Warm salt water gargles. Work note for return on 2025 if symptoms improve. Contact if symptoms worsen or persist beyond a week.    Can complete [facility] paperwork.   1. Symptoms of upper respiratory infection (URI)  -     POCT COVID-19 & Influenza A/B  -     POCT rapid strep A  -     Pseudoephedrine-DM-GG 60- MG TABS; Take 1 tablet by mouth 3 times daily as needed (uri sx), Disp-30 tablet, R-0Normal  -     fluticasone (FLONASE) 50 MCG/ACT nasal spray; 2 sprays by Each Nostril route daily, Disp-16 g, R-0Normal  -     Culture, Throat  2. Flu-like symptoms  -     POCT COVID-19 & Influenza A/B  -     POCT rapid strep A      Return in about 1 week (around 2025), or if symptoms worsen or fail to improve, for Follow Up.      SUBJECTIVE/OBJECTIVE:    History of Present Illness  The patient presents for evaluation of a sore throat.    Symptoms began with fatigue on 2025, followed by a sore throat on 2025. Accompanying symptoms include neck and arm pain, intermittent nasal congestion, rhinorrhea, and a low-grade fever (<100°F). No headaches, sinus

## 2025-04-27 LAB — BACTERIA THROAT AEROBE CULT: NORMAL

## 2025-04-28 ENCOUNTER — RESULTS FOLLOW-UP (OUTPATIENT)
Dept: FAMILY MEDICINE CLINIC | Age: 35
End: 2025-04-28

## 2025-04-28 NOTE — RESULT ENCOUNTER NOTE
Gio Gallegos Mrs. Jacques,    Your throat culture came back normal.  There is no evidence of bacterial infection at this time. You can return to clinic if symptoms worsen, change, persist.      Please let me know if you have any other questions or concerns.  ThanksBryce

## 2025-05-05 ENCOUNTER — TELEPHONE (OUTPATIENT)
Age: 35
End: 2025-05-05

## 2025-05-06 ENCOUNTER — TELEPHONE (OUTPATIENT)
Age: 35
End: 2025-05-06

## 2025-05-06 NOTE — TELEPHONE ENCOUNTER
Spoke to pt, to inform the [place] paperwork was filled out and that we would fax it after the $35 fee has been paid. Patient states she will be in to pay that today 05/06

## 2025-05-19 ENCOUNTER — PATIENT MESSAGE (OUTPATIENT)
Age: 35
End: 2025-05-19

## 2025-05-19 NOTE — TELEPHONE ENCOUNTER
Called patient and offered patient appointment on  Wednesday but declined.   Patient advised to go to [facility]

## 2025-06-20 ENCOUNTER — HOSPITAL ENCOUNTER (OUTPATIENT)
Age: 35
Discharge: HOME | End: 2025-06-20
Payer: COMMERCIAL

## 2025-06-20 ENCOUNTER — OFFICE VISIT (OUTPATIENT)
Age: 35
End: 2025-06-20

## 2025-06-20 VITALS
RESPIRATION RATE: 18 BRPM | OXYGEN SATURATION: 98 % | DIASTOLIC BLOOD PRESSURE: 74 MMHG | WEIGHT: 279.2 LBS | HEART RATE: 76 BPM | BODY MASS INDEX: 47.92 KG/M2 | SYSTOLIC BLOOD PRESSURE: 128 MMHG

## 2025-06-20 DIAGNOSIS — R16.1 SPLENOMEGALY: ICD-10-CM

## 2025-06-20 DIAGNOSIS — R10.84 GENERALIZED ABDOMINAL PAIN: Primary | ICD-10-CM

## 2025-06-20 DIAGNOSIS — R52 PAIN AGGRAVATED BY EATING OR DRINKING: ICD-10-CM

## 2025-06-20 DIAGNOSIS — E66.813 CLASS 3 SEVERE OBESITY DUE TO EXCESS CALORIES WITH SERIOUS COMORBIDITY AND BODY MASS INDEX (BMI) OF 45.0 TO 49.9 IN ADULT (HCC): ICD-10-CM

## 2025-06-20 DIAGNOSIS — R10.84 GENERALIZED ABDOMINAL PAIN: ICD-10-CM

## 2025-06-20 LAB
ALBUMIN SERPL-MCNC: 4.6 G/DL (ref 3.4–5)
ALBUMIN/GLOB SERPL: 1.4 {RATIO}
ALP SERPL-CCNC: 82 U/L (ref 40–129)
ALT SERPL-CCNC: 16 U/L (ref 10–40)
ANION GAP SERPL CALCULATED.3IONS-SCNC: 13 MMOL/L (ref 9–17)
AST SERPL-CCNC: 18 U/L (ref 15–37)
BASOPHILS # BLD: 0.03 K/UL
BASOPHILS NFR BLD: 0 % (ref 0–1)
BILIRUB SERPL-MCNC: 0.4 MG/DL (ref 0–1)
BILIRUBIN, POC: NEGATIVE
BLOOD URINE, POC: NEGATIVE
BUN SERPL-MCNC: 15 MG/DL (ref 7–20)
CALCIUM SERPL-MCNC: 9.7 MG/DL (ref 8.3–10.6)
CHLORIDE SERPL-SCNC: 102 MMOL/L (ref 99–110)
CLARITY, POC: CLEAR
CO2 SERPL-SCNC: 23 MMOL/L (ref 21–32)
COLOR, POC: YELLOW
CREAT SERPL-MCNC: 1.1 MG/DL (ref 0.6–1.1)
EOSINOPHIL # BLD: 0.19 K/UL
EOSINOPHILS RELATIVE PERCENT: 3 % (ref 0–3)
ERYTHROCYTE [DISTWIDTH] IN BLOOD BY AUTOMATED COUNT: 13.2 % (ref 11.7–14.9)
GFR, ESTIMATED: 68 ML/MIN/1.73M2
GLUCOSE SERPL-MCNC: 90 MG/DL (ref 74–99)
GLUCOSE URINE, POC: NEGATIVE MG/DL
HCT VFR BLD AUTO: 45.5 % (ref 37–47)
HGB BLD-MCNC: 14.7 G/DL (ref 12.5–16)
IMM GRANULOCYTES # BLD AUTO: 0.01 K/UL
IMM GRANULOCYTES NFR BLD: 0 %
KETONES, POC: NEGATIVE MG/DL
LEUKOCYTE EST, POC: NEGATIVE
LYMPHOCYTES NFR BLD: 2.08 K/UL
LYMPHOCYTES RELATIVE PERCENT: 27 % (ref 24–44)
MCH RBC QN AUTO: 28.1 PG (ref 27–31)
MCHC RBC AUTO-ENTMCNC: 32.3 G/DL (ref 32–36)
MCV RBC AUTO: 87 FL (ref 78–100)
MONOCYTES NFR BLD: 0.32 K/UL
MONOCYTES NFR BLD: 4 % (ref 0–5)
NEUTROPHILS NFR BLD: 66 % (ref 36–66)
NEUTS SEG NFR BLD: 5.11 K/UL
NITRITE, POC: NEGATIVE
PH, POC: 7
PLATELET # BLD AUTO: 290 K/UL (ref 140–440)
PMV BLD AUTO: 10 FL (ref 7.5–11.1)
POTASSIUM SERPL-SCNC: 4.3 MMOL/L (ref 3.5–5.1)
PROT SERPL-MCNC: 7.9 G/DL (ref 6.4–8.2)
PROTEIN, POC: NEGATIVE MG/DL
RBC # BLD AUTO: 5.23 M/UL (ref 4.2–5.4)
SODIUM SERPL-SCNC: 138 MMOL/L (ref 136–145)
SPECIFIC GRAVITY, POC: 1.02
UROBILINOGEN, POC: NORMAL MG/DL
WBC OTHER # BLD: 7.7 K/UL (ref 4–10.5)

## 2025-06-20 PROCEDURE — 36415 COLL VENOUS BLD VENIPUNCTURE: CPT

## 2025-06-20 PROCEDURE — 85025 COMPLETE CBC W/AUTO DIFF WBC: CPT

## 2025-06-20 PROCEDURE — 87338 HPYLORI STOOL AG IA: CPT

## 2025-06-20 PROCEDURE — 80053 COMPREHEN METABOLIC PANEL: CPT

## 2025-06-20 RX ORDER — ESOMEPRAZOLE MAGNESIUM 40 MG/1
40 CAPSULE, DELAYED RELEASE ORAL
Qty: 90 CAPSULE | Refills: 1 | Status: SHIPPED | OUTPATIENT
Start: 2025-06-20

## 2025-06-20 NOTE — PROGRESS NOTES
[facility]  [address]  [facility] [address]  Dept: [phone]  Dept Fax: [phone]  Loc: [phone]      Visit type: Established patient    Encounter Start Time: 11:45 AM EDT  Encounter End Time: 12:14 PM EDT     100% of the time was spent with the patient today, discussing their symptoms, conducting an examination, reviewing the patient's diagnostic test results, and counseling    Reason for Visit: Abdominal Pain (X1.5 weeks having symptoms. States upper abdomin hurts a lot after eating. Hunger pains make her throw up. Feels swollen and inflamed. States pain is sometime in multiple areas at the time.)      Assessment and Plan         Assessment & Plan  Generalized abdominal pain  Uncontrolled  Symptoms include pain that worsens after eating and drinking, regardless of temperature. Physical exam reveals tenderness and warmth in the abdominal area.  DIAGNOSTIC STUDIES:  - Comprehensive metabolic panel (CMP)  - Urinalysis  - H. pylori test  TREATMENT:  - Nexium will replace Protonix  - Referral to a gastroenterologist for further evaluation  FUTURE PLANS:  - Imaging studies will be conducted if lab results are inconclusive    Splenomegaly  Uncontrolled  The spleen appears enlarged upon physical examination.  DIAGNOSTIC STUDIES:  - Comprehensive metabolic panel (CMP)  - Urinalysis  - H. pylori test  - Imaging studies  TREATMENT:  - Referral to a gastroenterologist for further evaluation  FUTURE PLANS:  - Lab results and imaging studies will be reviewed to determine the cause of splenomegaly    Weight management  Uncontrolled  The patient has been advised to maintain a daily caloric intake of at least 1500 calories. Education provided on the importance of balanced nutrition and avoiding excessive intake of junk food.  TREATMENT:  - Maintain daily caloric intake of at least 1500 calories  - Education on balanced nutrition and avoiding excessive intake of junk

## 2025-06-23 ENCOUNTER — TELEPHONE (OUTPATIENT)
Dept: GASTROENTEROLOGY | Age: 35
End: 2025-06-23

## 2025-06-23 ENCOUNTER — HOSPITAL ENCOUNTER (OUTPATIENT)
Age: 35
Setting detail: SPECIMEN
Discharge: HOME | End: 2025-06-23

## 2025-06-23 NOTE — TELEPHONE ENCOUNTER
Tried to call the patient to schedule a new patient appt for abdominal pain and pain when eating/drinking, but there was no answer and VM was full.

## 2025-06-26 LAB — H PYLORI AG STL QL IA: NEGATIVE

## 2025-06-29 PROBLEM — E66.813 CLASS 3 SEVERE OBESITY DUE TO EXCESS CALORIES WITH SERIOUS COMORBIDITY AND BODY MASS INDEX (BMI) OF 45.0 TO 49.9 IN ADULT (HCC): Status: ACTIVE | Noted: 2025-06-29

## 2025-06-30 ENCOUNTER — TELEPHONE (OUTPATIENT)
Dept: GASTROENTEROLOGY | Age: 35
End: 2025-06-30

## 2025-07-07 ENCOUNTER — TELEPHONE (OUTPATIENT)
Dept: GASTROENTEROLOGY | Age: 35
End: 2025-07-07

## 2025-08-11 ENCOUNTER — OFFICE VISIT (OUTPATIENT)
Age: 35
End: 2025-08-11
Payer: COMMERCIAL

## 2025-08-11 ENCOUNTER — HOSPITAL ENCOUNTER (EMERGENCY)
Age: 35
Discharge: HOME | End: 2025-08-11
Attending: STUDENT IN AN ORGANIZED HEALTH CARE EDUCATION/TRAINING PROGRAM
Payer: COMMERCIAL

## 2025-08-11 VITALS
DIASTOLIC BLOOD PRESSURE: 86 MMHG | WEIGHT: 273.8 LBS | HEART RATE: 76 BPM | OXYGEN SATURATION: 100 % | HEIGHT: 64 IN | RESPIRATION RATE: 18 BRPM | TEMPERATURE: 98.8 F | BODY MASS INDEX: 46.74 KG/M2 | SYSTOLIC BLOOD PRESSURE: 132 MMHG

## 2025-08-11 VITALS
OXYGEN SATURATION: 98 % | RESPIRATION RATE: 20 BRPM | BODY MASS INDEX: 47.03 KG/M2 | SYSTOLIC BLOOD PRESSURE: 90 MMHG | HEART RATE: 84 BPM | WEIGHT: 274 LBS | DIASTOLIC BLOOD PRESSURE: 60 MMHG

## 2025-08-11 DIAGNOSIS — R10.13 ABDOMINAL PAIN, EPIGASTRIC: Primary | ICD-10-CM

## 2025-08-11 DIAGNOSIS — R10.32 LEFT LOWER QUADRANT ABDOMINAL PAIN: Primary | ICD-10-CM

## 2025-08-11 DIAGNOSIS — R69: ICD-10-CM

## 2025-08-11 LAB
ALBUMIN SERPL-MCNC: 3.9 G/DL (ref 3.4–5)
ALBUMIN/GLOB SERPL: 1.5 {RATIO}
ALP SERPL-CCNC: 72 U/L (ref 40–129)
ALT SERPL-CCNC: 16 U/L (ref 10–40)
ANION GAP SERPL CALCULATED.3IONS-SCNC: 11 MMOL/L (ref 9–17)
AST SERPL-CCNC: 19 U/L (ref 15–37)
BASOPHILS # BLD: 0.02 K/UL
BASOPHILS NFR BLD: 0 % (ref 0–1)
BILIRUB SERPL-MCNC: 0.3 MG/DL (ref 0–1)
BUN SERPL-MCNC: 12 MG/DL (ref 7–20)
CALCIUM SERPL-MCNC: 9.2 MG/DL (ref 8.3–10.6)
CHLORIDE SERPL-SCNC: 104 MMOL/L (ref 99–110)
CO2 SERPL-SCNC: 25 MMOL/L (ref 21–32)
CREAT SERPL-MCNC: 0.8 MG/DL (ref 0.6–1.1)
EOSINOPHIL # BLD: 0.15 K/UL
EOSINOPHILS RELATIVE PERCENT: 2 % (ref 0–3)
ERYTHROCYTE [DISTWIDTH] IN BLOOD BY AUTOMATED COUNT: 13.2 % (ref 11.7–14.9)
GFR, ESTIMATED: >90 ML/MIN/1.73M2
GLUCOSE SERPL-MCNC: 90 MG/DL (ref 74–99)
HCT VFR BLD AUTO: 40.9 % (ref 37–47)
HGB BLD-MCNC: 13.4 G/DL (ref 12.5–16)
IMM GRANULOCYTES # BLD AUTO: 0.02 K/UL
IMM GRANULOCYTES NFR BLD: 0 %
LACTATE BLDV-SCNC: 0.8 MMOL/L (ref 0.4–2)
LIPASE SERPL-CCNC: 21 U/L (ref 13–60)
LYMPHOCYTES NFR BLD: 2.32 K/UL
LYMPHOCYTES RELATIVE PERCENT: 38 % (ref 24–44)
MCH RBC QN AUTO: 28.8 PG (ref 27–31)
MCHC RBC AUTO-ENTMCNC: 32.8 G/DL (ref 32–36)
MCV RBC AUTO: 87.8 FL (ref 78–100)
MONOCYTES NFR BLD: 0.39 K/UL
MONOCYTES NFR BLD: 6 % (ref 0–5)
NEUTROPHILS NFR BLD: 53 % (ref 36–66)
NEUTS SEG NFR BLD: 3.24 K/UL
PLATELET # BLD AUTO: 223 K/UL (ref 140–440)
PMV BLD AUTO: 10.2 FL (ref 7.5–11.1)
POTASSIUM SERPL-SCNC: 4.2 MMOL/L (ref 3.5–5.1)
PROT SERPL-MCNC: 6.6 G/DL (ref 6.4–8.2)
RBC # BLD AUTO: 4.66 M/UL (ref 4.2–5.4)
SODIUM SERPL-SCNC: 140 MMOL/L (ref 136–145)
WBC OTHER # BLD: 6.1 K/UL (ref 4–10.5)

## 2025-08-11 PROCEDURE — 99215 OFFICE O/P EST HI 40 MIN: CPT

## 2025-08-11 PROCEDURE — 83690 ASSAY OF LIPASE: CPT

## 2025-08-11 PROCEDURE — 85025 COMPLETE CBC W/AUTO DIFF WBC: CPT

## 2025-08-11 PROCEDURE — 80053 COMPREHEN METABOLIC PANEL: CPT

## 2025-08-11 PROCEDURE — 83605 ASSAY OF LACTIC ACID: CPT

## 2025-08-11 PROCEDURE — 2580000003 HC RX 258: Performed by: STUDENT IN AN ORGANIZED HEALTH CARE EDUCATION/TRAINING PROGRAM

## 2025-08-11 PROCEDURE — 96361 HYDRATE IV INFUSION ADD-ON: CPT

## 2025-08-11 PROCEDURE — 99284 EMERGENCY DEPT VISIT MOD MDM: CPT

## 2025-08-11 PROCEDURE — 96374 THER/PROPH/DIAG INJ IV PUSH: CPT

## 2025-08-11 PROCEDURE — 6370000000 HC RX 637 (ALT 250 FOR IP): Performed by: STUDENT IN AN ORGANIZED HEALTH CARE EDUCATION/TRAINING PROGRAM

## 2025-08-11 PROCEDURE — 6360000002 HC RX W HCPCS: Performed by: STUDENT IN AN ORGANIZED HEALTH CARE EDUCATION/TRAINING PROGRAM

## 2025-08-11 RX ORDER — KETOROLAC TROMETHAMINE 30 MG/ML
30 INJECTION, SOLUTION INTRAMUSCULAR; INTRAVENOUS ONCE
Status: COMPLETED | OUTPATIENT
Start: 2025-08-11 | End: 2025-08-11

## 2025-08-11 RX ORDER — SODIUM CHLORIDE, SODIUM LACTATE, POTASSIUM CHLORIDE, CALCIUM CHLORIDE 600; 310; 30; 20 MG/100ML; MG/100ML; MG/100ML; MG/100ML
1000 INJECTION, SOLUTION INTRAVENOUS ONCE
Status: COMPLETED | OUTPATIENT
Start: 2025-08-11 | End: 2025-08-11

## 2025-08-11 RX ORDER — LIDOCAINE HYDROCHLORIDE 20 MG/ML
15 SOLUTION OROPHARYNGEAL ONCE
Status: COMPLETED | OUTPATIENT
Start: 2025-08-11 | End: 2025-08-11

## 2025-08-11 RX ORDER — MAGNESIUM HYDROXIDE/ALUMINUM HYDROXICE/SIMETHICONE 120; 1200; 1200 MG/30ML; MG/30ML; MG/30ML
30 SUSPENSION ORAL ONCE
Status: COMPLETED | OUTPATIENT
Start: 2025-08-11 | End: 2025-08-11

## 2025-08-11 RX ADMIN — ALUMINUM HYDROXIDE, MAGNESIUM HYDROXIDE, AND SIMETHICONE 30 ML: 200; 200; 20 SUSPENSION ORAL at 14:51

## 2025-08-11 RX ADMIN — Medication 15 ML: at 14:51

## 2025-08-11 RX ADMIN — KETOROLAC TROMETHAMINE 30 MG: 30 INJECTION, SOLUTION INTRAMUSCULAR at 14:50

## 2025-08-11 RX ADMIN — SODIUM CHLORIDE, SODIUM LACTATE, POTASSIUM CHLORIDE, AND CALCIUM CHLORIDE 1000 ML: .6; .31; .03; .02 INJECTION, SOLUTION INTRAVENOUS at 14:54

## 2025-08-11 ASSESSMENT — PAIN DESCRIPTION - DESCRIPTORS
DESCRIPTORS: ACHING;DISCOMFORT
DESCRIPTORS: ACHING;DISCOMFORT

## 2025-08-11 ASSESSMENT — PAIN DESCRIPTION - ORIENTATION
ORIENTATION: RIGHT;LEFT;UPPER
ORIENTATION: MID
ORIENTATION: MID

## 2025-08-11 ASSESSMENT — PAIN - FUNCTIONAL ASSESSMENT
PAIN_FUNCTIONAL_ASSESSMENT: 0-10
PAIN_FUNCTIONAL_ASSESSMENT: PREVENTS OR INTERFERES SOME ACTIVE ACTIVITIES AND ADLS

## 2025-08-11 ASSESSMENT — PAIN DESCRIPTION - PAIN TYPE
TYPE: ACUTE PAIN

## 2025-08-11 ASSESSMENT — PAIN DESCRIPTION - FREQUENCY
FREQUENCY: CONTINUOUS
FREQUENCY: INTERMITTENT
FREQUENCY: INTERMITTENT

## 2025-08-11 ASSESSMENT — PAIN DESCRIPTION - LOCATION
LOCATION: ABDOMEN

## 2025-08-11 ASSESSMENT — PAIN DESCRIPTION - ONSET: ONSET: ON-GOING

## 2025-08-11 ASSESSMENT — PAIN SCALES - GENERAL
PAINLEVEL_OUTOF10: 2
PAINLEVEL_OUTOF10: 4
PAINLEVEL_OUTOF10: 4
PAINLEVEL_OUTOF10: 3

## 2025-08-11 ASSESSMENT — LIFESTYLE VARIABLES
HOW MANY STANDARD DRINKS CONTAINING ALCOHOL DO YOU HAVE ON A TYPICAL DAY: 1 OR 2
HOW OFTEN DO YOU HAVE A DRINK CONTAINING ALCOHOL: MONTHLY OR LESS

## 2025-08-20 ENCOUNTER — OFFICE VISIT (OUTPATIENT)
Dept: GASTROENTEROLOGY | Age: 35
End: 2025-08-20
Payer: COMMERCIAL

## 2025-08-20 VITALS
DIASTOLIC BLOOD PRESSURE: 60 MMHG | SYSTOLIC BLOOD PRESSURE: 110 MMHG | RESPIRATION RATE: 16 BRPM | BODY MASS INDEX: 46.82 KG/M2 | HEIGHT: 64 IN | OXYGEN SATURATION: 97 % | HEART RATE: 74 BPM | WEIGHT: 274.25 LBS

## 2025-08-20 DIAGNOSIS — R10.13 EPIGASTRIC PAIN: Primary | ICD-10-CM

## 2025-08-20 DIAGNOSIS — R19.4 ALTERED BOWEL HABITS: ICD-10-CM

## 2025-08-20 DIAGNOSIS — R14.0 BLOATING: ICD-10-CM

## 2025-08-20 DIAGNOSIS — R63.4 UNINTENTIONAL WEIGHT LOSS: ICD-10-CM

## 2025-08-20 DIAGNOSIS — R63.0 POOR APPETITE: ICD-10-CM

## 2025-08-20 PROCEDURE — 99204 OFFICE O/P NEW MOD 45 MIN: CPT | Performed by: NURSE PRACTITIONER

## 2025-08-20 RX ORDER — POLYETHYLENE GLYCOL 3350, SODIUM SULFATE, POTASSIUM CHLORIDE, MAGNESIUM SULFATE, AND SODIUM CHLORIDE FOR ORAL SOLUTION 178.7-7.3G
1 KIT ORAL ONCE
Qty: 1 EACH | Refills: 0 | Status: SHIPPED | OUTPATIENT
Start: 2025-08-20 | End: 2025-08-20

## 2025-08-20 ASSESSMENT — ENCOUNTER SYMPTOMS
VOMITING: 0
CONSTIPATION: 0
SHORTNESS OF BREATH: 0
DIARRHEA: 0
PHOTOPHOBIA: 0
WHEEZING: 0
COUGH: 0
BACK PAIN: 1
COLOR CHANGE: 0
BLOOD IN STOOL: 0
EYE PAIN: 0
ABDOMINAL PAIN: 0
NAUSEA: 0